# Patient Record
Sex: FEMALE | Race: WHITE | NOT HISPANIC OR LATINO | Employment: UNEMPLOYED | URBAN - METROPOLITAN AREA
[De-identification: names, ages, dates, MRNs, and addresses within clinical notes are randomized per-mention and may not be internally consistent; named-entity substitution may affect disease eponyms.]

---

## 2017-01-04 ENCOUNTER — OFFICE VISIT (OUTPATIENT)
Dept: FAMILY MEDICINE CLINIC | Facility: CLINIC | Age: 57
End: 2017-01-04

## 2017-01-04 VITALS
HEIGHT: 65 IN | SYSTOLIC BLOOD PRESSURE: 132 MMHG | BODY MASS INDEX: 22.82 KG/M2 | OXYGEN SATURATION: 98 % | HEART RATE: 87 BPM | DIASTOLIC BLOOD PRESSURE: 84 MMHG | WEIGHT: 137 LBS | TEMPERATURE: 99 F

## 2017-01-04 DIAGNOSIS — F98.8 ADD (ATTENTION DEFICIT DISORDER): Primary | ICD-10-CM

## 2017-01-04 DIAGNOSIS — M79.10 MYALGIA: ICD-10-CM

## 2017-01-04 DIAGNOSIS — Z79.899 HIGH RISK MEDICATION USE: ICD-10-CM

## 2017-01-04 PROCEDURE — 99213 OFFICE O/P EST LOW 20 MIN: CPT | Performed by: FAMILY MEDICINE

## 2017-01-04 RX ORDER — HYDROCODONE BITARTRATE AND ACETAMINOPHEN 10; 325 MG/1; MG/1
1 TABLET ORAL EVERY 6 HOURS PRN
Qty: 100 TABLET | Refills: 0 | Status: SHIPPED | OUTPATIENT
Start: 2017-01-04 | End: 2017-02-02 | Stop reason: SDUPTHER

## 2017-01-04 RX ORDER — IBUPROFEN 800 MG/1
TABLET ORAL
Refills: 0 | COMMUNITY
Start: 2016-12-24 | End: 2017-03-28

## 2017-01-04 RX ORDER — PENICILLIN V POTASSIUM 500 MG/1
TABLET ORAL
Refills: 0 | COMMUNITY
Start: 2016-12-24 | End: 2017-03-28

## 2017-01-04 NOTE — MR AVS SNAPSHOT
Miguelina Beanlivan   1/4/2017 10:20 AM   Office Visit    Dept Phone:  [phone]   Encounter #:  [id number]    Provider:  Guanaco Reid MD   Department:  [facility]                Your Full Care Plan              Today's Medication Changes          These changes are accurate as of: 1/4/17 10:59 AM.  If you have any questions, ask your nurse or doctor.               Medication(s)that have changed:     BREO ELLIPTA IN   Inhale.   What changed:  Another medication with the same name was removed. Continue taking this medication, and follow the directions you see here.       HYDROcodone-acetaminophen  MG per tablet   Commonly known as:  NORCO   Take 1 tablet by mouth Every 6 (Six) Hours As Needed for moderate pain (4-6).   What changed:  when to take this         Stop taking medication(s)listed here:     albuterol 108 (90 BASE) MCG/ACT inhaler   Commonly known as:  VENTOLIN HFA           diazePAM 2 MG tablet   Commonly known as:  VALIUM                Where to Get Your Medications      You can get these medications from any pharmacy     Bring a paper prescription for each of these medications     HYDROcodone-acetaminophen  MG per tablet                  Your Updated Medication List          This list is accurate as of: 1/4/17 10:59 AM.  Always use your most recent med list.                acetaminophen 325 MG tablet   Commonly known as:  TYLENOL       * amphetamine-dextroamphetamine 10 MG tablet   Commonly known as:  ADDERALL       * amphetamine-dextroamphetamine 30 MG tablet   Commonly known as:  ADDERALL       BREO ELLIPTA IN       HYDROcodone-acetaminophen  MG per tablet   Commonly known as:  NORCO   Take 1 tablet by mouth Every 6 (Six) Hours As Needed for moderate pain (4-6).       ibuprofen 800 MG tablet   Commonly known as:  ADVIL,MOTRIN       penicillin v potassium 500 MG tablet   Commonly known as:  VEETID       * Notice:  This  "list has 2 medication(s) that are the same as other medications prescribed for you. Read the directions carefully, and ask your doctor or other care provider to review them with you.            You Were Diagnosed With        Codes Comments    ADD (attention deficit disorder)    -  Primary ICD-10-CM: F98.8  ICD-9-CM: 314.00     Myalgia     ICD-10-CM: M79.1  ICD-9-CM: 729.1       Instructions     None    Patient Instructions History      Upcoming Appointments     Visit Type Date Time Department    OFFICE VISIT 1/4/2017 10:20 AM [facility]    OFFICE VISIT 4/4/2017  9:40 AM [facility] Signup     Our records indicate that you have an active [employer] account.    You can view your After Visit Summary by going to [employer] and logging in with your [employer] username and password.  If you don't have a [employer] username and password but a parent or guardian has access to your record, the parent or guardian should login with their own [employer] username and password and access your record to view the After Visit Summary.    If you have questions, you can email [email or web address] or call [phone] to talk to our [employer] staff.  Remember, [employer] is NOT to be used for urgent needs.  For medical emergencies, dial 911.               Other Info from Your Visit           Your Appointments     Apr 04, 2017  9:40 AM EDT   Office Visit with Guanaco Reid MD   [facility] (--)    [address] [facility] [id number] [phone]           Arrive 15 minutes prior to appointment.              Allergies     Tetracyclines & Related        Reason for Visit     Follow-up 3 month f/u     ADD     COPD     Med Refill           Vital Signs     Blood Pressure Pulse Temperature Height Weight Oxygen Saturation    132/84 (BP Location: Left arm, Patient Position: Sitting, Cuff Size: Adult) 87 99 °F (37.2 °C) 65\" (165.1 cm) 137 lb (62.1 " kg) 98%    Body Mass Index Smoking Status                22.8 kg/m2 Former Smoker          Problems and Diagnoses Noted     ADD (attention deficit disorder)    Myalgia

## 2017-01-04 NOTE — PROGRESS NOTES
HPI  Miguelina Cruz is a 56 y.o. female who is here for follow up of fibromyalgia and attention deficit disorder.  Followed by psychiatrist.  Had recent dental surgery for abscess and was given pain medication.  This was not filled.  Also saw a podiatrist for foot pain and was given cortisone pills but she did not fill them.      Review of Systems   HENT: Positive for dental problem.          No past medical history on file.    Past Surgical History   Procedure Laterality Date   • Foot surgery         Family History   Problem Relation Age of Onset   • Lung disease Mother    • Other Other      malignant neoplasm   • Other Other      malignant neoplasm       Social History     Social History   • Marital status: Single     Spouse name: N/A   • Number of children: N/A   • Years of education: N/A     Occupational History   • Not on file.     Social History Main Topics   • Smoking status: Former Smoker   • Smokeless tobacco: Not on file   • Alcohol use Not on file   • Drug use: Not on file   • Sexual activity: Not on file     Other Topics Concern   • Not on file     Social History Narrative         Physical Exam   Constitutional: She is oriented to person, place, and time. She appears well-developed and well-nourished. No distress.   HENT:   Head: Normocephalic.   Mouth/Throat: Oropharynx is clear and moist.   Eyes: Conjunctivae and EOM are normal. Pupils are equal, round, and reactive to light.   Neck: Neck supple. No JVD present. Thyromegaly present.   Cardiovascular: Normal rate and regular rhythm.    Pulmonary/Chest: Effort normal and breath sounds normal.   Abdominal: Soft. She exhibits no distension. There is no tenderness.   Neurological: She is alert and oriented to person, place, and time. No cranial nerve deficit. She exhibits normal muscle tone. Coordination normal.   Skin: Skin is warm and dry.   Psychiatric: She has a normal mood and affect. Her behavior is normal. Judgment and thought content normal.    Nursing note and vitals reviewed.        Assessment/Plan    Miguelina was seen today for follow-up, add, copd and med refill.    Diagnoses and all orders for this visit:    ADD (attention deficit disorder)    Myalgia    Other orders  -     HYDROcodone-acetaminophen (NORCO)  MG per tablet; Take 1 tablet by mouth Every 6 (Six) Hours As Needed for moderate pain (4-6).     patient mostly here for routine follow-up of chronic muscle pain and attention deficit.  ADD is followed by her psychiatrist.  Muscle pain well controlled with current medication though did increase somewhat after a recent dental abscess requiring surgical intervention.  Continue current therapy and close follow-up in 3 months.    This note includes information entered using a voice recognition dictation system.  Though reviewed, some nonsensible errors may remain.

## 2017-02-01 ENCOUNTER — TELEPHONE (OUTPATIENT)
Dept: FAMILY MEDICINE CLINIC | Facility: CLINIC | Age: 57
End: 2017-02-01

## 2017-02-01 NOTE — TELEPHONE ENCOUNTER
Last office visit 01/04/2017, william 11/07/2016, filled 01/04/2017.    ----- Message from Sapphire Talbert sent at 2/1/2017  8:29 AM EST -----  HYDROcodone-acetaminophen (NORCO)  MG per tablet  Sig: Take 1 tablet by mouth Every 6 (Six) Hours As Needed for moderate pain (4-6).      [phone]

## 2017-02-02 RX ORDER — HYDROCODONE BITARTRATE AND ACETAMINOPHEN 10; 325 MG/1; MG/1
1 TABLET ORAL EVERY 6 HOURS PRN
Qty: 100 TABLET | Refills: 0 | Status: SHIPPED | OUTPATIENT
Start: 2017-02-02 | End: 2017-03-02 | Stop reason: SDUPTHER

## 2017-03-01 ENCOUNTER — TELEPHONE (OUTPATIENT)
Dept: FAMILY MEDICINE CLINIC | Facility: CLINIC | Age: 57
End: 2017-03-01

## 2017-03-01 NOTE — TELEPHONE ENCOUNTER
Future appointment 03/28/2017.    Last office visit 01/04/2017, william 11/07/2016 (UPDATED/DESK), filled 02/02/2017.    ----- Message from Gracia Kenyon sent at 3/1/2017  3:17 PM EST -----  Contact: PT  PT NEEDS A NEW RX FOR HYDROCODONE  MG 1 QID PRN      WOULD LIKE TO  TOMORROW    THANK YOU

## 2017-03-02 RX ORDER — HYDROCODONE BITARTRATE AND ACETAMINOPHEN 10; 325 MG/1; MG/1
1 TABLET ORAL EVERY 6 HOURS PRN
Qty: 100 TABLET | Refills: 0 | Status: SHIPPED | OUTPATIENT
Start: 2017-03-02 | End: 2017-03-28 | Stop reason: SDUPTHER

## 2017-03-28 ENCOUNTER — OFFICE VISIT (OUTPATIENT)
Dept: FAMILY MEDICINE CLINIC | Facility: CLINIC | Age: 57
End: 2017-03-28

## 2017-03-28 VITALS
BODY MASS INDEX: 23.16 KG/M2 | HEIGHT: 65 IN | WEIGHT: 139 LBS | SYSTOLIC BLOOD PRESSURE: 122 MMHG | DIASTOLIC BLOOD PRESSURE: 78 MMHG | OXYGEN SATURATION: 99 % | HEART RATE: 82 BPM | TEMPERATURE: 98.5 F

## 2017-03-28 DIAGNOSIS — J44.9 CHRONIC OBSTRUCTIVE PULMONARY DISEASE, UNSPECIFIED COPD TYPE (HCC): ICD-10-CM

## 2017-03-28 DIAGNOSIS — F98.8 ADD (ATTENTION DEFICIT DISORDER): ICD-10-CM

## 2017-03-28 DIAGNOSIS — M79.10 MYALGIA: ICD-10-CM

## 2017-03-28 DIAGNOSIS — Z79.899 HIGH RISK MEDICATION USE: Primary | ICD-10-CM

## 2017-03-28 DIAGNOSIS — Z00.00 HEALTH CARE MAINTENANCE: ICD-10-CM

## 2017-03-28 PROCEDURE — 99214 OFFICE O/P EST MOD 30 MIN: CPT | Performed by: FAMILY MEDICINE

## 2017-03-28 RX ORDER — HYDROCODONE BITARTRATE AND ACETAMINOPHEN 10; 325 MG/1; MG/1
1 TABLET ORAL EVERY 6 HOURS PRN
Qty: 100 TABLET | Refills: 0 | Status: SHIPPED | OUTPATIENT
Start: 2017-03-28 | End: 2017-04-24 | Stop reason: SDUPTHER

## 2017-03-28 RX ORDER — ALBUTEROL SULFATE 90 UG/1
2 AEROSOL, METERED RESPIRATORY (INHALATION) EVERY 4 HOURS PRN
Qty: 1 INHALER | Refills: 6 | Status: SHIPPED | OUTPATIENT
Start: 2017-03-28 | End: 2017-06-13 | Stop reason: SDUPTHER

## 2017-03-28 NOTE — PROGRESS NOTES
HPI  Miguelina Cruz is a 56 y.o. female who is here for follow up of chronic muscle pain well controlled with current medications.  Also COPD though discontinued smoking 3 years ago.  Reports little benefit from Brio and discussed trying a different medication.  Only uses rescue inhaler on an intermittent basis.  Also asking about repeat lab work which will be obtained.  Also requested results of previous pulmonary function tests which unfortunately are very difficult to obtain actual results though able to locate orders and large amount of other useless information.      Review of Systems   Respiratory: Negative for cough and wheezing.    Musculoskeletal: Positive for myalgias.   Psychiatric/Behavioral:        Treated for ADD   All other systems reviewed and are negative.        No past medical history on file.    Past Surgical History:   Procedure Laterality Date   • FOOT SURGERY         Family History   Problem Relation Age of Onset   • Lung disease Mother    • Other Other      malignant neoplasm   • Other Other      malignant neoplasm       Social History     Social History   • Marital status: Single     Spouse name: N/A   • Number of children: N/A   • Years of education: N/A     Occupational History   • Not on file.     Social History Main Topics   • Smoking status: Former Smoker   • Smokeless tobacco: Not on file   • Alcohol use Not on file   • Drug use: Not on file   • Sexual activity: Not on file     Other Topics Concern   • Not on file     Social History Narrative         Physical Exam   Constitutional: She is oriented to person, place, and time. She appears well-developed and well-nourished.   HENT:   Head: Normocephalic.   Nose: Nose normal.   Mouth/Throat: Oropharynx is clear and moist.   Eyes: Conjunctivae and EOM are normal. Pupils are equal, round, and reactive to light.   Neck: Neck supple. No JVD present. No tracheal deviation present. No thyromegaly present.   Cardiovascular: Normal rate, regular  rhythm and normal heart sounds.    Pulmonary/Chest: Effort normal and breath sounds normal.   Abdominal: Soft.   Musculoskeletal: Normal range of motion. She exhibits no edema or deformity.   Lymphadenopathy:     She has no cervical adenopathy.   Neurological: She is alert and oriented to person, place, and time.   Skin: Skin is warm and dry.   Psychiatric: She has a normal mood and affect. Her behavior is normal. Judgment and thought content normal.   Nursing note and vitals reviewed.        Assessment/Plan    Miguelina was seen today for follow-up, add, pain and labs only.    Diagnoses and all orders for this visit:    High risk medication use  -     CBC & Differential  -     Comprehensive Metabolic Panel    Health care maintenance  -     Lipid Panel  -     Hepatitis C Antibody    Chronic obstructive pulmonary disease, unspecified COPD type  -     Umeclidinium-Vilanterol (ANORO ELLIPTA) 62.5-25 MCG/INH aerosol powder ; Inhale 1 inhaler Daily.    ADD (attention deficit disorder)    Myalgia    Other orders  -     albuterol (PROVENTIL HFA;VENTOLIN HFA) 108 (90 BASE) MCG/ACT inhaler; Inhale 2 puffs Every 4 (Four) Hours As Needed for Wheezing.  -     HYDROcodone-acetaminophen (NORCO)  MG per tablet; Take 1 tablet by mouth Every 6 (Six) Hours As Needed for Moderate Pain (4-6).          Patient here for follow-up of above-noted medical problems.  Reports she notes little benefit from current Breo inhaler.  Discussed trial of a different inhaler for her COPD.  Uses rescue inhaler on rare occasion but would like a refill.  Otherwise will get routine annual lab as discussed and keep routine follow-up appointment in 3 months especially for continuation of intermittent pain medication for her myalgia.    This note includes information entered using a voice recognition dictation system.  Though reviewed, some nonsensible errors may remain.

## 2017-03-29 LAB
ALBUMIN SERPL-MCNC: 5 G/DL (ref 3.5–5.2)
ALBUMIN/GLOB SERPL: 2.1 G/DL
ALP SERPL-CCNC: 64 U/L (ref 39–117)
ALT SERPL-CCNC: 12 U/L (ref 1–33)
AST SERPL-CCNC: 21 U/L (ref 1–32)
BASOPHILS # BLD AUTO: 0.01 10*3/MM3 (ref 0–0.2)
BASOPHILS NFR BLD AUTO: 0.1 % (ref 0–1.5)
BILIRUB SERPL-MCNC: 0.5 MG/DL (ref 0.1–1.2)
BUN SERPL-MCNC: 14 MG/DL (ref 6–20)
BUN/CREAT SERPL: 17.9 (ref 7–25)
CALCIUM SERPL-MCNC: 10 MG/DL (ref 8.6–10.5)
CHLORIDE SERPL-SCNC: 100 MMOL/L (ref 98–107)
CHOLEST SERPL-MCNC: 174 MG/DL (ref 0–200)
CO2 SERPL-SCNC: 24.3 MMOL/L (ref 22–29)
CREAT SERPL-MCNC: 0.78 MG/DL (ref 0.57–1)
EOSINOPHIL # BLD AUTO: 0.11 10*3/MM3 (ref 0–0.7)
EOSINOPHIL NFR BLD AUTO: 1.5 % (ref 0.3–6.2)
ERYTHROCYTE [DISTWIDTH] IN BLOOD BY AUTOMATED COUNT: 11.7 % (ref 11.7–13)
GLOBULIN SER CALC-MCNC: 2.4 GM/DL
GLUCOSE SERPL-MCNC: 100 MG/DL (ref 65–99)
HCT VFR BLD AUTO: 43.9 % (ref 35.6–45.5)
HCV AB S/CO SERPL IA: <0.1 S/CO RATIO (ref 0–0.9)
HDLC SERPL-MCNC: 85 MG/DL (ref 40–60)
HGB BLD-MCNC: 14.6 G/DL (ref 11.9–15.5)
IMM GRANULOCYTES # BLD: 0 10*3/MM3 (ref 0–0.03)
IMM GRANULOCYTES NFR BLD: 0 % (ref 0–0.5)
INTERPRETATION: NORMAL
LDLC SERPL CALC-MCNC: 72 MG/DL (ref 0–100)
LYMPHOCYTES # BLD AUTO: 2.47 10*3/MM3 (ref 0.9–4.8)
LYMPHOCYTES NFR BLD AUTO: 32.6 % (ref 19.6–45.3)
Lab: NORMAL
MCH RBC QN AUTO: 33.7 PG (ref 26.9–32)
MCHC RBC AUTO-ENTMCNC: 33.3 G/DL (ref 32.4–36.3)
MCV RBC AUTO: 101.4 FL (ref 80.5–98.2)
MONOCYTES # BLD AUTO: 0.45 10*3/MM3 (ref 0.2–1.2)
MONOCYTES NFR BLD AUTO: 5.9 % (ref 5–12)
NEUTROPHILS # BLD AUTO: 4.53 10*3/MM3 (ref 1.9–8.1)
NEUTROPHILS NFR BLD AUTO: 59.9 % (ref 42.7–76)
PLATELET # BLD AUTO: 351 10*3/MM3 (ref 140–500)
POTASSIUM SERPL-SCNC: 4 MMOL/L (ref 3.5–5.2)
PROT SERPL-MCNC: 7.4 G/DL (ref 6–8.5)
RBC # BLD AUTO: 4.33 10*6/MM3 (ref 3.9–5.2)
SODIUM SERPL-SCNC: 140 MMOL/L (ref 136–145)
TRIGL SERPL-MCNC: 86 MG/DL (ref 0–150)
VLDLC SERPL CALC-MCNC: 17.2 MG/DL (ref 5–40)
WBC # BLD AUTO: 7.57 10*3/MM3 (ref 4.5–10.7)

## 2017-04-05 ENCOUNTER — TELEPHONE (OUTPATIENT)
Dept: FAMILY MEDICINE CLINIC | Facility: CLINIC | Age: 57
End: 2017-04-05

## 2017-04-05 DIAGNOSIS — J44.9 CHRONIC OBSTRUCTIVE PULMONARY DISEASE, UNSPECIFIED COPD TYPE (HCC): ICD-10-CM

## 2017-04-05 NOTE — TELEPHONE ENCOUNTER
----- Message from Gracia Kenyon sent at 4/5/2017  9:14 AM EDT -----  Contact: PT  DR BLANTON GAVE HER SAMPLES OF ANORO, IT WORKS FOR HER AND NOW SHE WANTS A RX    [phone]

## 2017-04-24 RX ORDER — HYDROCODONE BITARTRATE AND ACETAMINOPHEN 10; 325 MG/1; MG/1
1 TABLET ORAL EVERY 6 HOURS PRN
Qty: 100 TABLET | Refills: 0 | Status: SHIPPED | OUTPATIENT
Start: 2017-04-24 | End: 2017-05-22 | Stop reason: SDUPTHER

## 2017-04-24 NOTE — TELEPHONE ENCOUNTER
Last ov 3/28/17  Last william 2/27/17  Last filled 3/28/17  Ok to refill Rx ?     ----- Message from Sapphire Talbert sent at 4/24/2017 12:38 PM EDT -----  HYDROcodone-acetaminophen (NORCO)  MG per tablet   Sig: Take 1 tablet by mouth Every 6 (Six) Hours As Needed for Moderate Pain (4-6).

## 2017-05-10 ENCOUNTER — TELEPHONE (OUTPATIENT)
Dept: FAMILY MEDICINE CLINIC | Facility: CLINIC | Age: 57
End: 2017-05-10

## 2017-05-10 DIAGNOSIS — J44.9 CHRONIC OBSTRUCTIVE PULMONARY DISEASE, UNSPECIFIED COPD TYPE (HCC): ICD-10-CM

## 2017-05-22 ENCOUNTER — TELEPHONE (OUTPATIENT)
Dept: FAMILY MEDICINE CLINIC | Facility: CLINIC | Age: 57
End: 2017-05-22

## 2017-05-22 RX ORDER — HYDROCODONE BITARTRATE AND ACETAMINOPHEN 10; 325 MG/1; MG/1
1 TABLET ORAL EVERY 6 HOURS PRN
Qty: 100 TABLET | Refills: 0 | Status: SHIPPED | OUTPATIENT
Start: 2017-05-22 | End: 2017-06-16 | Stop reason: SDUPTHER

## 2017-06-13 ENCOUNTER — OFFICE VISIT (OUTPATIENT)
Dept: FAMILY MEDICINE CLINIC | Facility: CLINIC | Age: 57
End: 2017-06-13

## 2017-06-13 VITALS
BODY MASS INDEX: 22.02 KG/M2 | OXYGEN SATURATION: 94 % | SYSTOLIC BLOOD PRESSURE: 116 MMHG | HEART RATE: 87 BPM | HEIGHT: 66 IN | DIASTOLIC BLOOD PRESSURE: 80 MMHG | WEIGHT: 137 LBS

## 2017-06-13 DIAGNOSIS — M79.10 MYALGIA: ICD-10-CM

## 2017-06-13 DIAGNOSIS — Z79.899 HIGH RISK MEDICATION USE: ICD-10-CM

## 2017-06-13 DIAGNOSIS — J44.9 CHRONIC OBSTRUCTIVE PULMONARY DISEASE, UNSPECIFIED COPD TYPE (HCC): Primary | ICD-10-CM

## 2017-06-13 PROCEDURE — 99214 OFFICE O/P EST MOD 30 MIN: CPT | Performed by: FAMILY MEDICINE

## 2017-06-13 RX ORDER — ALBUTEROL SULFATE 90 UG/1
2 AEROSOL, METERED RESPIRATORY (INHALATION) EVERY 4 HOURS PRN
Qty: 1 INHALER | Refills: 6 | Status: SHIPPED | OUTPATIENT
Start: 2017-06-13 | End: 2018-08-08 | Stop reason: SDUPTHER

## 2017-06-13 NOTE — PROGRESS NOTES
MARGARITA Cruz is a 56 y.o. female who is here for follow up especially of questionable COPD.  Some confusion regarding current medications but apparently was using both Anoro and Breo.  This was discussed.  Patient reports intermittent episodes of shortness of air and history is more consistent with asthma.  Of interest pulmonary function tests were fairly unremarkable though chest x-ray read as showing emphysema.  Patient discontinued cigarette use 4 years ago.      Review of Systems   Respiratory: Positive for shortness of breath. Negative for wheezing.    Musculoskeletal: Positive for myalgias.   All other systems reviewed and are negative.        No past medical history on file.    Past Surgical History:   Procedure Laterality Date   • FOOT SURGERY         Family History   Problem Relation Age of Onset   • Lung disease Mother    • Other Other      malignant neoplasm   • Other Other      malignant neoplasm       Social History     Social History   • Marital status: Single     Spouse name: N/A   • Number of children: N/A   • Years of education: N/A     Occupational History   • Not on file.     Social History Main Topics   • Smoking status: Former Smoker   • Smokeless tobacco: Not on file   • Alcohol use Not on file   • Drug use: Not on file   • Sexual activity: Not on file     Other Topics Concern   • Not on file     Social History Narrative         Physical Exam   Constitutional: She is oriented to person, place, and time. She appears well-developed and well-nourished.   HENT:   Head: Normocephalic.   Mouth/Throat: Oropharynx is clear and moist.   Eyes: Conjunctivae are normal. Pupils are equal, round, and reactive to light.   Neck: Normal range of motion. Neck supple. No thyromegaly present.   Cardiovascular: Normal rate and regular rhythm.    Pulmonary/Chest: Effort normal. No respiratory distress. She has no wheezes. She has no rales.   Abdominal: Soft. She exhibits no distension.   Musculoskeletal: She  exhibits no edema.   Neurological: She is alert and oriented to person, place, and time. Coordination normal.   Skin: Skin is warm.   Psychiatric: She has a normal mood and affect. Her behavior is normal. Judgment and thought content normal.   Nursing note and vitals reviewed.        Assessment/Plan    Diagnoses and all orders for this visit:    Chronic obstructive pulmonary disease, unspecified COPD type    Myalgia    High risk medication use    Other orders  -     Fluticasone Furoate-Vilanterol (BREO ELLIPTA) 100-25 MCG/INH aerosol powder ; Inhale 1 inhaler Daily.  -     albuterol (PROVENTIL HFA;VENTOLIN HFA) 108 (90 BASE) MCG/ACT inhaler; Inhale 2 puffs Every 4 (Four) Hours As Needed for Wheezing.  -     Umeclidinium Bromide (INCRUSE ELLIPTA) 62.5 MCG/INH aerosol powder ; Inhale 1 inhaler Daily.      Patient here for routine follow-up especially of myalgias which are well controlled with current regimen.  Followed by psychiatrist for attention deficit disorder also doing well.  Some confusion regarding therapy for lung problems.  This is complicated by insurance coverage which seems to be changing every month.  Discussed continuing current medication with rescue inhaler.  Will give sample of additional medication and call if improves and will try to determine what medication is currently covered?  Otherwise continue current therapy and follow-up in 3 months.    This note includes information entered using a voice recognition dictation system.  Though reviewed, some nonsensible errors may remain.

## 2017-06-16 ENCOUNTER — TELEPHONE (OUTPATIENT)
Dept: FAMILY MEDICINE CLINIC | Facility: CLINIC | Age: 57
End: 2017-06-16

## 2017-06-16 RX ORDER — HYDROCODONE BITARTRATE AND ACETAMINOPHEN 10; 325 MG/1; MG/1
1 TABLET ORAL EVERY 6 HOURS PRN
Qty: 100 TABLET | Refills: 0 | Status: SHIPPED | OUTPATIENT
Start: 2017-06-16 | End: 2017-07-12 | Stop reason: SDUPTHER

## 2017-06-16 NOTE — TELEPHONE ENCOUNTER
Hydrocodone 10-325mg one tablet 3-4 times daily    [phone]    Last ov 6/2017,sandeep 5/2017, rf5/22/17

## 2017-07-11 ENCOUNTER — TELEPHONE (OUTPATIENT)
Dept: FAMILY MEDICINE CLINIC | Facility: CLINIC | Age: 57
End: 2017-07-11

## 2017-07-11 NOTE — TELEPHONE ENCOUNTER
No future appointment.    Last office visit 06/13/2017.  Braxton 05/20/2017.  Filled 06/16/2017.    Thank you.    ----- Message from Sapphire Talbert sent at 7/11/2017 12:26 PM EDT -----  HYDROcodone-acetaminophen (NORCO)  MG per tablet  Sig: Take 1 tablet by mouth Every 6 (Six) Hours As Needed for Moderate Pain (4-6).

## 2017-07-12 RX ORDER — HYDROCODONE BITARTRATE AND ACETAMINOPHEN 10; 325 MG/1; MG/1
1 TABLET ORAL EVERY 6 HOURS PRN
Qty: 100 TABLET | Refills: 0 | Status: SHIPPED | OUTPATIENT
Start: 2017-07-12 | End: 2017-08-04 | Stop reason: SDUPTHER

## 2017-08-04 RX ORDER — HYDROCODONE BITARTRATE AND ACETAMINOPHEN 10; 325 MG/1; MG/1
1 TABLET ORAL EVERY 6 HOURS PRN
Qty: 100 TABLET | Refills: 0 | Status: SHIPPED | OUTPATIENT
Start: 2017-08-04 | End: 2017-08-28 | Stop reason: SDUPTHER

## 2017-08-04 NOTE — TELEPHONE ENCOUNTER
Last ov 6/13/17  Last william 5/20/17  Last filled 7/12/17  Ok to refill ?     ----- Message from Sapphire Talbert sent at 8/4/2017  9:55 AM EDT -----  PT NEEDS WRITTEN RX FOR:  #HYDROCODONE 10/325 1 QID #100  PLEASE CALL [phone] WHEN READY

## 2017-08-28 ENCOUNTER — OFFICE VISIT (OUTPATIENT)
Dept: FAMILY MEDICINE CLINIC | Facility: CLINIC | Age: 57
End: 2017-08-28

## 2017-08-28 VITALS
SYSTOLIC BLOOD PRESSURE: 136 MMHG | DIASTOLIC BLOOD PRESSURE: 72 MMHG | BODY MASS INDEX: 22.11 KG/M2 | TEMPERATURE: 99.2 F | WEIGHT: 137.6 LBS | HEIGHT: 66 IN | OXYGEN SATURATION: 99 % | RESPIRATION RATE: 16 BRPM | HEART RATE: 63 BPM

## 2017-08-28 DIAGNOSIS — Z79.899 HIGH RISK MEDICATION USE: ICD-10-CM

## 2017-08-28 DIAGNOSIS — R30.0 DYSURIA: ICD-10-CM

## 2017-08-28 DIAGNOSIS — M79.10 MYALGIA: Primary | ICD-10-CM

## 2017-08-28 DIAGNOSIS — J44.9 CHRONIC OBSTRUCTIVE PULMONARY DISEASE, UNSPECIFIED COPD TYPE (HCC): ICD-10-CM

## 2017-08-28 LAB
BILIRUB BLD-MCNC: NEGATIVE MG/DL
CLARITY, POC: CLEAR
COLOR UR: YELLOW
GLUCOSE UR STRIP-MCNC: NEGATIVE MG/DL
KETONES UR QL: NEGATIVE
LEUKOCYTE EST, POC: ABNORMAL
NITRITE UR-MCNC: NEGATIVE MG/ML
PH UR: 5.5 [PH] (ref 5–8)
PROT UR STRIP-MCNC: NEGATIVE MG/DL
RBC # UR STRIP: ABNORMAL /UL
SP GR UR: 1.02 (ref 1–1.03)
UROBILINOGEN UR QL: NORMAL

## 2017-08-28 PROCEDURE — 99213 OFFICE O/P EST LOW 20 MIN: CPT | Performed by: FAMILY MEDICINE

## 2017-08-28 RX ORDER — HYDROCODONE BITARTRATE AND ACETAMINOPHEN 10; 325 MG/1; MG/1
1 TABLET ORAL EVERY 6 HOURS PRN
Qty: 100 TABLET | Refills: 0 | Status: SHIPPED | OUTPATIENT
Start: 2017-08-28 | End: 2017-09-22 | Stop reason: SDUPTHER

## 2017-08-28 NOTE — PROGRESS NOTES
HPI  Miguelina Cruz is a 57 y.o. female who is here for follow up especially of pain medication needed for general muscular skeletal aches and pains.  She remains functional on current medications and there is no evidence of overuse or abuse.  Seems to be doing very well on current regimen.  Over the last several weeks has had intermittent episodes of urinary urgency and frequency and some dysuria.  Reports has not had bladder infection for several years.  Has been using cranberry tablets and drinking cranberry juice which she thinks has helped.      Review of Systems   Constitutional: Negative for chills and fever.   Genitourinary: Positive for difficulty urinating, dysuria and frequency.   All other systems reviewed and are negative.        No past medical history on file.    Past Surgical History:   Procedure Laterality Date   • FOOT SURGERY         Family History   Problem Relation Age of Onset   • Lung disease Mother    • Other Other      malignant neoplasm   • Other Other      malignant neoplasm       Social History     Social History   • Marital status: Single     Spouse name: N/A   • Number of children: N/A   • Years of education: N/A     Occupational History   • Not on file.     Social History Main Topics   • Smoking status: Former Smoker   • Smokeless tobacco: Not on file   • Alcohol use Yes   • Drug use: Yes     Special: Hydrocodone   • Sexual activity: No     Other Topics Concern   • Not on file     Social History Narrative         Physical Exam   Constitutional: She is oriented to person, place, and time. She appears well-developed and well-nourished. No distress.   HENT:   Head: Normocephalic.   Nose: Nose normal.   Mouth/Throat: Oropharynx is clear and moist.   Eyes: Conjunctivae and EOM are normal. Pupils are equal, round, and reactive to light.   Neck: Normal range of motion. Neck supple.   Cardiovascular: Normal rate, regular rhythm and normal heart sounds.    Pulmonary/Chest: Effort normal. No  respiratory distress.   Abdominal: Soft. Bowel sounds are normal.   Musculoskeletal: She exhibits no edema or deformity.   Neurological: She is alert and oriented to person, place, and time.   Skin: Skin is warm and dry.   Psychiatric: She has a normal mood and affect. Her behavior is normal. Judgment and thought content normal.   Nursing note and vitals reviewed.        Assessment/Plan    Miguelina was seen today for copd and urinary tract infection.    Diagnoses and all orders for this visit:    Myalgia  -     HYDROcodone-acetaminophen (NORCO)  MG per tablet; Take 1 tablet by mouth Every 6 (Six) Hours As Needed for Moderate Pain .    Chronic obstructive pulmonary disease, unspecified COPD type    High risk medication use    Dysuria  -     POCT urinalysis dipstick, automated  -     Urine Culture      Patient presents for routine follow-up of chronic musculoskeletal pains well controlled with current medication.  Over the last few weeks has been having some urinary urgency and frequency and is concerned about urinary tract infection.  Urinalysis in the office is unremarkable but will send for culture and view of persistent symptoms.  Treat with appropriate antibiotic if indicated.  Also need to discuss recommendations for routine mammography and colonoscopy.  Pulmonary status seems to be very stable on current regimen.  Follow-up in 3 months or as needed.    This note includes information entered using a voice recognition dictation system.  Though reviewed, some nonsensible errors may remain.

## 2017-08-30 LAB
BACTERIA UR CULT: NO GROWTH
BACTERIA UR CULT: NORMAL

## 2017-09-21 ENCOUNTER — TELEPHONE (OUTPATIENT)
Dept: FAMILY MEDICINE CLINIC | Facility: CLINIC | Age: 57
End: 2017-09-21

## 2017-09-21 NOTE — TELEPHONE ENCOUNTER
NARC AGREEMENT 2016.    APPOINTMENT EVERY 3 MONTHS.    No future appointment.    Last O.V. 2017.  Braxton 2017.  Filled 2017.    Thank you.    ----- Message from Elijah Vaca sent at 2017 12:27 PM EDT -----  Patient would like a refill on hydrocodone 10mg #100    Thanks,  Dafne

## 2017-09-22 ENCOUNTER — TELEPHONE (OUTPATIENT)
Dept: FAMILY MEDICINE CLINIC | Facility: CLINIC | Age: 57
End: 2017-09-22

## 2017-09-22 DIAGNOSIS — Z12.31 ENCOUNTER FOR SCREENING MAMMOGRAM FOR BREAST CANCER: Primary | ICD-10-CM

## 2017-09-22 DIAGNOSIS — M79.10 MYALGIA: ICD-10-CM

## 2017-09-22 RX ORDER — HYDROCODONE BITARTRATE AND ACETAMINOPHEN 10; 325 MG/1; MG/1
1 TABLET ORAL EVERY 6 HOURS PRN
Qty: 100 TABLET | Refills: 0 | Status: SHIPPED | OUTPATIENT
Start: 2017-09-22 | End: 2017-10-16 | Stop reason: SDUPTHER

## 2017-09-22 NOTE — TELEPHONE ENCOUNTER
Patient would like an order for a mammogram to be done at [facility].    She received a letter stating she was due for one.    Patient's contact #[phone].    Thank you.

## 2017-10-10 ENCOUNTER — HOSPITAL ENCOUNTER (OUTPATIENT)
Dept: MAMMOGRAPHY | Facility: HOSPITAL | Age: 57
Discharge: HOME | End: 2017-10-10
Attending: FAMILY MEDICINE | Admitting: FAMILY MEDICINE

## 2017-10-10 DIAGNOSIS — Z12.31 ENCOUNTER FOR SCREENING MAMMOGRAM FOR BREAST CANCER: ICD-10-CM

## 2017-10-10 PROCEDURE — G0202 SCR MAMMO BI INCL CAD: HCPCS

## 2017-10-16 ENCOUNTER — TELEPHONE (OUTPATIENT)
Dept: FAMILY MEDICINE CLINIC | Facility: CLINIC | Age: 57
End: 2017-10-16

## 2017-10-16 DIAGNOSIS — M79.10 MYALGIA: ICD-10-CM

## 2017-10-16 RX ORDER — HYDROCODONE BITARTRATE AND ACETAMINOPHEN 10; 325 MG/1; MG/1
1 TABLET ORAL EVERY 6 HOURS PRN
Qty: 100 TABLET | Refills: 0 | Status: SHIPPED | OUTPATIENT
Start: 2017-10-16 | End: 2017-11-09 | Stop reason: SDUPTHER

## 2017-10-16 NOTE — TELEPHONE ENCOUNTER
No future appointment.    Last O.V. 08/28/2017.  Braxton 09/19/2017.  Filled 09/22/2017.    Thank you.      ----- Message from Elijah Vaca sent at 10/16/2017  8:07 AM EDT -----  Patient would like a refill on hydrocodone 10mg #100    She can be reached at [phone]    Thanks,  Dafne

## 2017-11-08 ENCOUNTER — TELEPHONE (OUTPATIENT)
Dept: FAMILY MEDICINE CLINIC | Facility: CLINIC | Age: 57
End: 2017-11-08

## 2017-11-09 DIAGNOSIS — M79.10 MYALGIA: ICD-10-CM

## 2017-11-09 RX ORDER — HYDROCODONE BITARTRATE AND ACETAMINOPHEN 10; 325 MG/1; MG/1
1 TABLET ORAL EVERY 6 HOURS PRN
Qty: 100 TABLET | Refills: 0 | Status: SHIPPED | OUTPATIENT
Start: 2017-11-09 | End: 2017-12-01 | Stop reason: SDUPTHER

## 2017-11-27 ENCOUNTER — OFFICE VISIT (OUTPATIENT)
Dept: FAMILY MEDICINE CLINIC | Facility: CLINIC | Age: 57
End: 2017-11-27

## 2017-11-27 VITALS
WEIGHT: 141.4 LBS | BODY MASS INDEX: 22.73 KG/M2 | SYSTOLIC BLOOD PRESSURE: 150 MMHG | DIASTOLIC BLOOD PRESSURE: 88 MMHG | TEMPERATURE: 99.5 F | HEART RATE: 86 BPM | RESPIRATION RATE: 16 BRPM | HEIGHT: 66 IN | OXYGEN SATURATION: 99 %

## 2017-11-27 DIAGNOSIS — F98.8 ATTENTION DEFICIT DISORDER, UNSPECIFIED HYPERACTIVITY PRESENCE: ICD-10-CM

## 2017-11-27 DIAGNOSIS — Z23 IMMUNIZATION DUE: ICD-10-CM

## 2017-11-27 DIAGNOSIS — J44.9 CHRONIC OBSTRUCTIVE PULMONARY DISEASE, UNSPECIFIED COPD TYPE (HCC): Primary | ICD-10-CM

## 2017-11-27 DIAGNOSIS — M21.962 ACQUIRED DEFORMITY OF LEFT FOOT: ICD-10-CM

## 2017-11-27 DIAGNOSIS — M79.10 MYALGIA: ICD-10-CM

## 2017-11-27 DIAGNOSIS — Z79.899 HIGH RISK MEDICATION USE: ICD-10-CM

## 2017-11-27 PROCEDURE — 90686 IIV4 VACC NO PRSV 0.5 ML IM: CPT | Performed by: FAMILY MEDICINE

## 2017-11-27 PROCEDURE — 90471 IMMUNIZATION ADMIN: CPT | Performed by: FAMILY MEDICINE

## 2017-11-27 PROCEDURE — 99213 OFFICE O/P EST LOW 20 MIN: CPT | Performed by: FAMILY MEDICINE

## 2017-11-27 NOTE — PROGRESS NOTES
HPI  Miguelina Cruz is a 57 y.o. female who is here for follow up of COPD and also persistent left foot pain.  Is especially worse in the winter when she cannot wear opened toed shoes.  Was seen by a foot doctor one year ago and given medication only.  Would like to be referred to a foot doctor close to her home.  Has known COPD with intermittent shortness of air.  No longer smokes.  Otherwise seems to be stable on current regimen which will be continued.      Review of Systems   Respiratory: Positive for shortness of breath.    Musculoskeletal: Positive for gait problem and myalgias.   All other systems reviewed and are negative.        No past medical history on file.    Past Surgical History:   Procedure Laterality Date   • FOOT SURGERY         Family History   Problem Relation Age of Onset   • Lung disease Mother    • Other Other      malignant neoplasm   • Other Other      malignant neoplasm       Social History     Social History   • Marital status: Single     Spouse name: N/A   • Number of children: N/A   • Years of education: N/A     Occupational History   • Not on file.     Social History Main Topics   • Smoking status: Former Smoker   • Smokeless tobacco: Not on file   • Alcohol use Yes   • Drug use: Yes     Special: Hydrocodone   • Sexual activity: No     Other Topics Concern   • Not on file     Social History Narrative         Physical Exam      Assessment/Plan    Miguelina was seen today for copd, fibromyalgia and foot pain.    Diagnoses and all orders for this visit:    Chronic obstructive pulmonary disease, unspecified COPD type  -     Umeclidinium-Vilanterol 62.5-25 MCG/INH aerosol powder ; Inhale 1 puff Daily.    Attention deficit disorder, unspecified hyperactivity presence    High risk medication use    Acquired deformity of left foot  -     Ambulatory Referral to Podiatry      Patient is here for routine follow-up especially of chronic pain management related to myalgia.  Doing extremely well on  current regimen which will be continued.  Patient continues to work and is fully functional.  Main complaint is left foot pain which is worsened in the winter when she cannot wear opened toed shoes.  Has seen podiatrist in the past but requests referral to one closer to her home.  Also reports did better on previous inhaler but for some reason was not covered by insurance?  Currently on Breo though history most consistent with COPD more so than asthma?  Has discontinued cigarette use.  Overall doing well on current regimen except will change inhaler and call if symptoms worsen.  Otherwise follow-up in 3 months.    This note includes information entered using a voice recognition dictation system.  Though reviewed, some nonsensible errors may remain.

## 2017-12-01 ENCOUNTER — TELEPHONE (OUTPATIENT)
Dept: FAMILY MEDICINE CLINIC | Facility: CLINIC | Age: 57
End: 2017-12-01

## 2017-12-01 DIAGNOSIS — M79.10 MYALGIA: ICD-10-CM

## 2017-12-01 RX ORDER — HYDROCODONE BITARTRATE AND ACETAMINOPHEN 10; 325 MG/1; MG/1
1 TABLET ORAL EVERY 6 HOURS PRN
Qty: 100 TABLET | Refills: 0 | Status: SHIPPED | OUTPATIENT
Start: 2017-12-01 | End: 2017-12-27 | Stop reason: SDUPTHER

## 2017-12-01 NOTE — TELEPHONE ENCOUNTER
NORCO  MG.    No future appointment.    Last O.V. 11/27/2017.  Braxton 09/19/2017 (UPDATED).  Filled 11/09/2017.    Thank you.

## 2017-12-25 ENCOUNTER — HOSPITAL ENCOUNTER (EMERGENCY)
Facility: HOSPITAL | Age: 57
Discharge: LEFT BEFORE DISCHARGE | End: 2017-12-25

## 2017-12-25 VITALS
DIASTOLIC BLOOD PRESSURE: 78 MMHG | WEIGHT: 135 LBS | OXYGEN SATURATION: 98 % | BODY MASS INDEX: 22.49 KG/M2 | HEIGHT: 65 IN | TEMPERATURE: 98.2 F | SYSTOLIC BLOOD PRESSURE: 127 MMHG | RESPIRATION RATE: 18 BRPM | HEART RATE: 121 BPM

## 2017-12-25 LAB
FLUAV AG NPH QL: NEGATIVE
FLUBV AG NPH QL IA: NEGATIVE

## 2017-12-25 PROCEDURE — 87804 INFLUENZA ASSAY W/OPTIC: CPT | Performed by: EMERGENCY MEDICINE

## 2017-12-25 PROCEDURE — 99211 OFF/OP EST MAY X REQ PHY/QHP: CPT

## 2017-12-26 ENCOUNTER — TELEPHONE (OUTPATIENT)
Dept: FAMILY MEDICINE CLINIC | Facility: CLINIC | Age: 57
End: 2017-12-26

## 2017-12-26 NOTE — TELEPHONE ENCOUNTER
No future appointment.    Last O.V. 11/27/2017.  Braxton 11/29/2017.  Filled 12/01/2017.    Thank you.    ----- Message from Candace Goldberg sent at 12/26/2017 10:57 AM EST -----  PATIENT NEEDS A REFILL ON THE FOLLOWING  HYDROCODONE     PATIENT NUMBER [phone]

## 2017-12-27 DIAGNOSIS — M79.10 MYALGIA: ICD-10-CM

## 2017-12-27 RX ORDER — HYDROCODONE BITARTRATE AND ACETAMINOPHEN 10; 325 MG/1; MG/1
1 TABLET ORAL EVERY 6 HOURS PRN
Qty: 100 TABLET | Refills: 0 | Status: SHIPPED | OUTPATIENT
Start: 2017-12-27 | End: 2018-01-18 | Stop reason: SDUPTHER

## 2018-01-18 ENCOUNTER — TELEPHONE (OUTPATIENT)
Dept: FAMILY MEDICINE CLINIC | Facility: CLINIC | Age: 58
End: 2018-01-18

## 2018-01-18 DIAGNOSIS — M79.10 MYALGIA: ICD-10-CM

## 2018-01-18 RX ORDER — HYDROCODONE BITARTRATE AND ACETAMINOPHEN 10; 325 MG/1; MG/1
1 TABLET ORAL EVERY 6 HOURS PRN
Qty: 100 TABLET | Refills: 0 | Status: SHIPPED | OUTPATIENT
Start: 2018-01-18 | End: 2018-02-09 | Stop reason: SDUPTHER

## 2018-01-18 NOTE — TELEPHONE ENCOUNTER
No future appointment.    Last O.V. 11/27/2017.  Braxton 11/29/2017.  Filled 12/27/2017.    Thank you.    ----- Message from Sapphire Talbert sent at 1/18/2018 10:08 AM EST -----  HYDROcodone-acetaminophen (NORCO)  MG per tablet  Sig: Take 1 tablet by mouth Every 6 (Six) Hours As Needed for Moderate Pain .

## 2018-02-07 ENCOUNTER — OFFICE VISIT (OUTPATIENT)
Dept: FAMILY MEDICINE CLINIC | Facility: CLINIC | Age: 58
End: 2018-02-07

## 2018-02-07 VITALS
SYSTOLIC BLOOD PRESSURE: 136 MMHG | HEIGHT: 65 IN | BODY MASS INDEX: 24.16 KG/M2 | HEART RATE: 90 BPM | DIASTOLIC BLOOD PRESSURE: 82 MMHG | WEIGHT: 145 LBS | TEMPERATURE: 99 F | OXYGEN SATURATION: 94 %

## 2018-02-07 DIAGNOSIS — M79.10 MYALGIA: Primary | ICD-10-CM

## 2018-02-07 DIAGNOSIS — Z79.899 HIGH RISK MEDICATION USE: ICD-10-CM

## 2018-02-07 DIAGNOSIS — J44.9 CHRONIC OBSTRUCTIVE PULMONARY DISEASE, UNSPECIFIED COPD TYPE (HCC): ICD-10-CM

## 2018-02-07 PROCEDURE — 99213 OFFICE O/P EST LOW 20 MIN: CPT | Performed by: FAMILY MEDICINE

## 2018-02-07 NOTE — PROGRESS NOTES
MARGARITA Cruz is a 57 y.o. female who is here for follow up of chronic pain related to fibromyalgia.  Despite literature seems to be under excellent control with current regimen which will be continued.  Otherwise denies any new complaints and seems to be doing well.  Complains of COPD but remains off cigarettes.  Also followed by another physician for ADHD.      Review of Systems   Respiratory: Negative for cough, shortness of breath and wheezing.    Musculoskeletal: Positive for arthralgias and myalgias.   All other systems reviewed and are negative.        Past Medical History:   Diagnosis Date   • ADHD    • COPD (chronic obstructive pulmonary disease)    • Fibromyalgia        Past Surgical History:   Procedure Laterality Date   • FOOT SURGERY         Family History   Problem Relation Age of Onset   • Lung disease Mother    • Other Other      malignant neoplasm   • Other Other      malignant neoplasm       Social History     Social History   • Marital status: Single     Spouse name: N/A   • Number of children: N/A   • Years of education: N/A     Occupational History   • Not on file.     Social History Main Topics   • Smoking status: Former Smoker   • Smokeless tobacco: Not on file   • Alcohol use Yes      Comment: OCCASIONAL   • Drug use: No   • Sexual activity: No     Other Topics Concern   • Not on file     Social History Narrative         Physical Exam   Constitutional: She is oriented to person, place, and time. She appears well-developed and well-nourished.   HENT:   Head: Normocephalic.   Mouth/Throat: Oropharynx is clear and moist.   Eyes: Conjunctivae are normal. Pupils are equal, round, and reactive to light.   Neck: Normal range of motion. No thyromegaly present.   Cardiovascular: Normal rate.    Pulmonary/Chest: Effort normal and breath sounds normal. No respiratory distress.   Musculoskeletal: Normal range of motion.   Neurological: She is alert and oriented to person, place, and time.  Coordination normal.   Skin: Skin is warm and dry.   Psychiatric: She has a normal mood and affect. Her behavior is normal. Judgment and thought content normal.   Nursing note and vitals reviewed.        Assessment/Plan    Miguelina was seen today for add and myalgia.    Diagnoses and all orders for this visit:    Myalgia    High risk medication use  -     Urine Drug Screen - Urine, Clean Catch    Chronic obstructive pulmonary disease, unspecified COPD type      Patient is mostly here for follow-up of continued pain medication.  Functioning very well on current regimen with no evidence of misuse or abuse of medication.  Continue current therapy including follow-up every 3 months.    This note includes information entered using a voice recognition dictation system.  Though reviewed, some nonsensible errors may remain.

## 2018-02-08 ENCOUNTER — TELEPHONE (OUTPATIENT)
Dept: FAMILY MEDICINE CLINIC | Facility: CLINIC | Age: 58
End: 2018-02-08

## 2018-02-09 DIAGNOSIS — M79.10 MYALGIA: ICD-10-CM

## 2018-02-09 RX ORDER — HYDROCODONE BITARTRATE AND ACETAMINOPHEN 10; 325 MG/1; MG/1
1 TABLET ORAL EVERY 6 HOURS PRN
Qty: 100 TABLET | Refills: 0 | Status: SHIPPED | OUTPATIENT
Start: 2018-02-09 | End: 2018-03-05 | Stop reason: SDUPTHER

## 2018-02-15 LAB
AMPHET UR CFM-MCNC: >4000 NG/ML
AMPHET UR QL CFM: POSITIVE
AMPHETAMINES UR QL SCN: NORMAL NG/ML
AMPHETAMINES UR QL: POSITIVE
BARBITURATES UR QL SCN: NEGATIVE NG/ML
BENZODIAZ UR QL SCN: NEGATIVE NG/ML
BZE UR QL SCN: NEGATIVE NG/ML
CANNABINOIDS UR QL SCN: NEGATIVE NG/ML
CODEINE UR QL: NEGATIVE
CREAT UR-MCNC: 108.1 MG/DL (ref 20–300)
HYDROCODONE UR CFM-MCNC: 1170 NG/ML
HYDROCODONE UR QL: POSITIVE
HYDROMORPHONE UR CFM-MCNC: 833 NG/ML
HYDROMORPHONE UR QL: POSITIVE
LABORATORY COMMENT REPORT: NORMAL
METHADONE UR QL SCN: NEGATIVE NG/ML
METHAMPHET UR QL CFM: NEGATIVE
MORPHINE UR QL: NEGATIVE
OPIATES UR QL SCN: NORMAL NG/ML
OPIATES UR QL SCN: POSITIVE NG/ML
OXYCODONE+OXYMORPHONE UR QL SCN: NEGATIVE NG/ML
PCP UR QL: NEGATIVE NG/ML
PH UR: 5.5 [PH] (ref 4.5–8.9)
PROPOXYPH UR QL SCN: NEGATIVE NG/ML

## 2018-03-05 ENCOUNTER — TELEPHONE (OUTPATIENT)
Dept: FAMILY MEDICINE CLINIC | Facility: CLINIC | Age: 58
End: 2018-03-05

## 2018-03-05 DIAGNOSIS — M79.10 MYALGIA: ICD-10-CM

## 2018-03-05 RX ORDER — HYDROCODONE BITARTRATE AND ACETAMINOPHEN 10; 325 MG/1; MG/1
1 TABLET ORAL EVERY 6 HOURS PRN
Qty: 100 TABLET | Refills: 0 | Status: SHIPPED | OUTPATIENT
Start: 2018-03-05 | End: 2018-03-28 | Stop reason: SDUPTHER

## 2018-03-05 NOTE — TELEPHONE ENCOUNTER
Last ov 2/7/18  Last william 11/29/17  Last filled 2/9/18  Ok to refill ?    ----- Message from Elijah Vaca sent at 3/5/2018 11:01 AM EST -----  Patient would like a refill on hydrocodone  MG #100    Thanks,  Dafne

## 2018-03-28 ENCOUNTER — TELEPHONE (OUTPATIENT)
Dept: FAMILY MEDICINE CLINIC | Facility: CLINIC | Age: 58
End: 2018-03-28

## 2018-03-28 DIAGNOSIS — M79.10 MYALGIA: ICD-10-CM

## 2018-03-28 RX ORDER — HYDROCODONE BITARTRATE AND ACETAMINOPHEN 10; 325 MG/1; MG/1
1 TABLET ORAL EVERY 6 HOURS PRN
Qty: 100 TABLET | Refills: 0 | Status: SHIPPED | OUTPATIENT
Start: 2018-03-28 | End: 2018-04-19 | Stop reason: SDUPTHER

## 2018-03-28 NOTE — TELEPHONE ENCOUNTER
No future appointment.    Last O.V. 02/07/2018.  Braxton 11/29/2017. (UPDATED).  Filled 03/06/2018.    Thank you.    ----- Message from Sapphire Talbert sent at 3/28/2018  9:11 AM EDT -----  HYDROcodone-acetaminophen (NORCO)  MG per tablet  Sig: Take 1 tablet by mouth Every 6 (Six) Hours As Needed for Moderate Pain .

## 2018-04-19 ENCOUNTER — TELEPHONE (OUTPATIENT)
Dept: FAMILY MEDICINE CLINIC | Facility: CLINIC | Age: 58
End: 2018-04-19

## 2018-04-19 DIAGNOSIS — M79.10 MYALGIA: ICD-10-CM

## 2018-04-19 RX ORDER — HYDROCODONE BITARTRATE AND ACETAMINOPHEN 10; 325 MG/1; MG/1
1 TABLET ORAL EVERY 6 HOURS PRN
Qty: 100 TABLET | Refills: 0 | Status: SHIPPED | OUTPATIENT
Start: 2018-04-19 | End: 2018-05-10 | Stop reason: SDUPTHER

## 2018-04-19 NOTE — TELEPHONE ENCOUNTER
No future appointment.    Last O.V. 02/07/2018.  Braxton 03/26/2018.  Filled 03/28/2018.    Thank you.    ----- Message from Elijah Vaca sent at 4/19/2018  9:50 AM EDT -----  Patient would like a refill on hydrocodone 10/325 #100    Thanks,  Dafne

## 2018-05-05 DIAGNOSIS — J44.9 CHRONIC OBSTRUCTIVE PULMONARY DISEASE, UNSPECIFIED COPD TYPE (HCC): ICD-10-CM

## 2018-05-07 ENCOUNTER — OFFICE VISIT (OUTPATIENT)
Dept: FAMILY MEDICINE CLINIC | Facility: CLINIC | Age: 58
End: 2018-05-07

## 2018-05-07 VITALS
RESPIRATION RATE: 16 BRPM | HEART RATE: 67 BPM | DIASTOLIC BLOOD PRESSURE: 72 MMHG | OXYGEN SATURATION: 96 % | HEIGHT: 65 IN | SYSTOLIC BLOOD PRESSURE: 128 MMHG | TEMPERATURE: 99.6 F | BODY MASS INDEX: 24.29 KG/M2 | WEIGHT: 145.8 LBS

## 2018-05-07 DIAGNOSIS — Z87.891 HISTORY OF CIGARETTE SMOKING: ICD-10-CM

## 2018-05-07 DIAGNOSIS — Z79.899 HIGH RISK MEDICATION USE: ICD-10-CM

## 2018-05-07 DIAGNOSIS — M79.10 MYALGIA: ICD-10-CM

## 2018-05-07 DIAGNOSIS — J44.9 CHRONIC OBSTRUCTIVE PULMONARY DISEASE, UNSPECIFIED COPD TYPE (HCC): Primary | ICD-10-CM

## 2018-05-07 PROCEDURE — 99213 OFFICE O/P EST LOW 20 MIN: CPT | Performed by: FAMILY MEDICINE

## 2018-05-07 NOTE — PROGRESS NOTES
HPI  Miguelina Cruz is a 57 y.o. female who is here for follow up especially of fibromyalgia and chronic pain which is well controlled by current regimen.  Patient does report some cough and shortness of air which seems to be allergy related.  She remains on daily inhaler for her COPD.  Discontinued smoking 4 years ago.      Review of Systems   Constitutional: Negative for chills, diaphoresis, fever and unexpected weight change.   Respiratory: Positive for shortness of breath. Negative for wheezing.    Allergic/Immunologic: Positive for environmental allergies.   All other systems reviewed and are negative.        Past Medical History:   Diagnosis Date   • ADHD    • COPD (chronic obstructive pulmonary disease)    • Fibromyalgia        Past Surgical History:   Procedure Laterality Date   • FOOT SURGERY         Family History   Problem Relation Age of Onset   • Lung disease Mother    • Other Other      malignant neoplasm   • Other Other      malignant neoplasm       Social History     Social History   • Marital status: Single     Spouse name: N/A   • Number of children: N/A   • Years of education: N/A     Occupational History   • Not on file.     Social History Main Topics   • Smoking status: Former Smoker   • Smokeless tobacco: Not on file   • Alcohol use Yes      Comment: OCCASIONAL   • Drug use: No   • Sexual activity: No     Other Topics Concern   • Not on file     Social History Narrative   • No narrative on file         Physical Exam   Constitutional: She is oriented to person, place, and time. She appears well-developed and well-nourished. No distress.   HENT:   Head: Normocephalic and atraumatic.   Nose: Nose normal.   Mouth/Throat: Oropharynx is clear and moist.   Eyes: Conjunctivae and EOM are normal. Pupils are equal, round, and reactive to light.   Neck: Normal range of motion. No thyromegaly present.   Cardiovascular: Normal rate and regular rhythm.    Pulmonary/Chest: Effort normal. No respiratory  distress. She has no wheezes. She has no rales.   Abdominal: Soft. She exhibits no distension. There is no tenderness.   Musculoskeletal: She exhibits no edema or deformity.   Lymphadenopathy:     She has no cervical adenopathy.   Neurological: She is alert and oriented to person, place, and time. She exhibits normal muscle tone. Coordination normal.   Skin: Skin is warm and dry.   Psychiatric: She has a normal mood and affect. Her behavior is normal. Judgment and thought content normal.   Nursing note and vitals reviewed.        Assessment/Plan    Miguelina was seen today for copd, add, allergies, shortness of breath and cough.    Diagnoses and all orders for this visit:    Chronic obstructive pulmonary disease, unspecified COPD type    High risk medication use    Myalgia    History of cigarette smoking    Patient is here mostly for follow-up of pain medication.  Patient has chronic myalgia condition which is well controlled with current regimen.  There is no evidence of misuse or abuse of medication.  Does report some cough and congestion for the last few weeks which seems to be allergic in nature.  Overall controlled with symptomatic therapy.  Seems to be seasonal and improving.  Discussed over-the-counter antihistamine trial but avoiding decongestants.  Patient does use rescue inhaler as needed as well as daily maintenance inhaler.  Overall doing well on current regimen.  Follow-up in 3 months.    This note includes information entered using a voice recognition dictation system.  Though reviewed, some nonsensible errors may remain.

## 2018-05-10 ENCOUNTER — TELEPHONE (OUTPATIENT)
Dept: FAMILY MEDICINE CLINIC | Facility: CLINIC | Age: 58
End: 2018-05-10

## 2018-05-10 DIAGNOSIS — M79.10 MYALGIA: ICD-10-CM

## 2018-05-10 RX ORDER — HYDROCODONE BITARTRATE AND ACETAMINOPHEN 10; 325 MG/1; MG/1
1 TABLET ORAL EVERY 6 HOURS PRN
Qty: 100 TABLET | Refills: 0 | Status: SHIPPED | OUTPATIENT
Start: 2018-05-10 | End: 2018-06-05 | Stop reason: SDUPTHER

## 2018-06-04 ENCOUNTER — TELEPHONE (OUTPATIENT)
Dept: FAMILY MEDICINE CLINIC | Facility: CLINIC | Age: 58
End: 2018-06-04

## 2018-06-04 NOTE — TELEPHONE ENCOUNTER
No future O.V. Appt.    Last O.V. 05/07/2018.  Braxton 03/26/2018  Filled 05/13/2018.    THANK YOU.    ----- Message from Radha Peralta sent at 6/4/2018  9:41 AM EDT -----  PT NEEDS WRITTEN RX FOR:  #HYDROCODONE 10/325 #100  CALL [phone] WHEN READY

## 2018-06-05 DIAGNOSIS — M79.10 MYALGIA: ICD-10-CM

## 2018-06-05 RX ORDER — HYDROCODONE BITARTRATE AND ACETAMINOPHEN 10; 325 MG/1; MG/1
1 TABLET ORAL EVERY 6 HOURS PRN
Qty: 100 TABLET | Refills: 0 | Status: SHIPPED | OUTPATIENT
Start: 2018-06-05 | End: 2018-06-27 | Stop reason: SDUPTHER

## 2018-06-13 DIAGNOSIS — J44.9 CHRONIC OBSTRUCTIVE PULMONARY DISEASE, UNSPECIFIED COPD TYPE (HCC): ICD-10-CM

## 2018-06-27 ENCOUNTER — TELEPHONE (OUTPATIENT)
Dept: FAMILY MEDICINE CLINIC | Facility: CLINIC | Age: 58
End: 2018-06-27

## 2018-06-27 DIAGNOSIS — M79.10 MYALGIA: ICD-10-CM

## 2018-06-27 RX ORDER — HYDROCODONE BITARTRATE AND ACETAMINOPHEN 10; 325 MG/1; MG/1
1 TABLET ORAL EVERY 6 HOURS PRN
Qty: 100 TABLET | Refills: 0 | Status: SHIPPED | OUTPATIENT
Start: 2018-06-27 | End: 2018-07-19 | Stop reason: SDUPTHER

## 2018-06-27 NOTE — TELEPHONE ENCOUNTER
No future O.V.     Last O.V. 05/07/2018.  Braxton 06/02/2018.  Filled 06/05/2018.    Thank you.    ----- Message from Candace Goldberg sent at 6/27/2018  9:19 AM EDT -----  HYDROcodone-acetaminophen (NORCO)  MG per tablet   Take 1 tablet by mouth Every 6 (Six) Hours As Needed for Moderate Pain .      PLEASE CALL [phone] WHEN READY

## 2018-07-19 ENCOUNTER — TELEPHONE (OUTPATIENT)
Dept: FAMILY MEDICINE CLINIC | Facility: CLINIC | Age: 58
End: 2018-07-19

## 2018-07-19 DIAGNOSIS — M79.10 MYALGIA: ICD-10-CM

## 2018-07-19 RX ORDER — HYDROCODONE BITARTRATE AND ACETAMINOPHEN 10; 325 MG/1; MG/1
1 TABLET ORAL EVERY 6 HOURS PRN
Qty: 100 TABLET | Refills: 0 | Status: SHIPPED | OUTPATIENT
Start: 2018-07-19 | End: 2018-08-08 | Stop reason: SDUPTHER

## 2018-07-19 NOTE — TELEPHONE ENCOUNTER
Future O.V. 08/06/2018.    Last O.V. 05/07/2018.  Braxton 06/02/2018.  Filled 06/27/2018.    Thank you.    ----- Message from Sapphire Talbert sent at 7/19/2018 11:01 AM EDT -----  HYDROcodone-acetaminophen (NORCO)  MG per tablet   Sig: Take 1 tablet by mouth Every 6 (Six) Hours As Needed for Moderate Pain .

## 2018-08-06 DIAGNOSIS — J44.9 CHRONIC OBSTRUCTIVE PULMONARY DISEASE, UNSPECIFIED COPD TYPE (HCC): ICD-10-CM

## 2018-08-08 ENCOUNTER — OFFICE VISIT (OUTPATIENT)
Dept: FAMILY MEDICINE CLINIC | Facility: CLINIC | Age: 58
End: 2018-08-08

## 2018-08-08 VITALS
HEIGHT: 65 IN | SYSTOLIC BLOOD PRESSURE: 126 MMHG | DIASTOLIC BLOOD PRESSURE: 76 MMHG | WEIGHT: 145.4 LBS | TEMPERATURE: 98.8 F | OXYGEN SATURATION: 99 % | RESPIRATION RATE: 16 BRPM | BODY MASS INDEX: 24.22 KG/M2 | HEART RATE: 88 BPM

## 2018-08-08 DIAGNOSIS — J44.9 CHRONIC OBSTRUCTIVE PULMONARY DISEASE, UNSPECIFIED COPD TYPE (HCC): ICD-10-CM

## 2018-08-08 DIAGNOSIS — J06.9 ACUTE URI: Primary | ICD-10-CM

## 2018-08-08 DIAGNOSIS — Z79.899 HIGH RISK MEDICATION USE: ICD-10-CM

## 2018-08-08 DIAGNOSIS — M79.10 MYALGIA: ICD-10-CM

## 2018-08-08 PROCEDURE — 99213 OFFICE O/P EST LOW 20 MIN: CPT | Performed by: FAMILY MEDICINE

## 2018-08-08 RX ORDER — ESCITALOPRAM OXALATE 5 MG/1
5 TABLET ORAL DAILY
Refills: 0 | COMMUNITY
Start: 2018-08-06 | End: 2018-11-12

## 2018-08-08 RX ORDER — HYDROCODONE BITARTRATE AND ACETAMINOPHEN 10; 325 MG/1; MG/1
1 TABLET ORAL EVERY 6 HOURS PRN
Qty: 100 TABLET | Refills: 0 | Status: SHIPPED | OUTPATIENT
Start: 2018-08-08 | End: 2018-09-04 | Stop reason: SDUPTHER

## 2018-08-08 RX ORDER — ALBUTEROL SULFATE 90 UG/1
2 AEROSOL, METERED RESPIRATORY (INHALATION) EVERY 4 HOURS PRN
Qty: 1 INHALER | Refills: 6 | Status: SHIPPED | OUTPATIENT
Start: 2018-08-08 | End: 2019-02-12 | Stop reason: SDUPTHER

## 2018-08-08 NOTE — PROGRESS NOTES
MARGARITA Cruz is a 58 y.o. female who is here for follow up of chronic fibromyalgia pain well controlled with current medication.  Has a new psychiatrist at [facility] and has had some medication adjustments.  Last week had upper respiratory infection but earlier this week began to improve without specific therapy.  Patient remains off cigarettes.      Review of Systems   Constitutional: Negative for chills, diaphoresis, fever and unexpected weight change.   HENT: Positive for congestion, rhinorrhea, sinus pressure and sore throat.    Respiratory: Positive for cough.    All other systems reviewed and are negative.        Past Medical History:   Diagnosis Date   • ADHD    • COPD (chronic obstructive pulmonary disease) (CMS/HCC)    • Fibromyalgia        Past Surgical History:   Procedure Laterality Date   • FOOT SURGERY         Family History   Problem Relation Age of Onset   • Lung disease Mother    • Other Other         malignant neoplasm   • Other Other         malignant neoplasm       Social History     Social History   • Marital status: Single     Spouse name: N/A   • Number of children: N/A   • Years of education: N/A     Occupational History   • Not on file.     Social History Main Topics   • Smoking status: Former Smoker   • Smokeless tobacco: Not on file   • Alcohol use Yes      Comment: OCCASIONAL   • Drug use: No   • Sexual activity: No     Other Topics Concern   • Not on file     Social History Narrative   • No narrative on file         Physical Exam   Constitutional: She is oriented to person, place, and time. She appears well-developed and well-nourished. No distress.   HENT:   Head: Normocephalic.   Right Ear: Hearing, tympanic membrane and ear canal normal.   Left Ear: Hearing, tympanic membrane and ear canal normal.   Mouth/Throat: Uvula is midline, oropharynx is clear and moist and mucous membranes are normal. Tonsils are 0 on the right. Tonsils are 0 on the left. No tonsillar exudate.    Eyes: Pupils are equal, round, and reactive to light. Conjunctivae and EOM are normal.   Neck: Normal range of motion. No thyromegaly present.   Cardiovascular: Normal rate and regular rhythm.    Pulmonary/Chest: Effort normal and breath sounds normal. No respiratory distress.   Abdominal: Soft. She exhibits no distension and no mass.   Musculoskeletal: Normal range of motion. She exhibits no edema or deformity.   Lymphadenopathy:     She has no cervical adenopathy.   Neurological: She is alert and oriented to person, place, and time. Coordination normal.   Skin: Skin is warm and dry.   Psychiatric: She has a normal mood and affect. Her behavior is normal. Judgment and thought content normal.   Nursing note and vitals reviewed.        Assessment/Plan    Miguelina was seen today for copd and add.    Diagnoses and all orders for this visit:    Acute URI    Chronic obstructive pulmonary disease, unspecified COPD type (CMS/HCC)  -     umeclidinium-vilanterol (ANORO ELLIPTA) 62.5-25 MCG/INH aerosol powder  inhaler; Inhale 1 puff Daily.  -     albuterol (PROVENTIL HFA;VENTOLIN HFA) 108 (90 Base) MCG/ACT inhaler; Inhale 2 puffs Every 4 (Four) Hours As Needed for Wheezing.    Myalgia  -     HYDROcodone-acetaminophen (NORCO)  MG per tablet; Take 1 tablet by mouth Every 6 (Six) Hours As Needed for Moderate Pain .    High risk medication use      Patient here mostly for follow-up of chronic musculoskeletal pain well controlled with current pain regimen.  Patient remains very functional and is doing well on current regimen.  No evidence of misuse nor abuse of medication.  Did have a recent upper respiratory infection but this resolved with symptomatic therapy only.  Patient reports she remains off cigarettes.  Continue current therapy including close monitoring every 3 months.    This note includes information entered using a voice recognition dictation system.  Though reviewed, some nonsensible errors may remain.

## 2018-09-04 ENCOUNTER — TELEPHONE (OUTPATIENT)
Dept: FAMILY MEDICINE CLINIC | Facility: CLINIC | Age: 58
End: 2018-09-04

## 2018-09-04 DIAGNOSIS — M79.10 MYALGIA: ICD-10-CM

## 2018-09-04 RX ORDER — HYDROCODONE BITARTRATE AND ACETAMINOPHEN 10; 325 MG/1; MG/1
1 TABLET ORAL EVERY 6 HOURS PRN
Qty: 100 TABLET | Refills: 0 | Status: SHIPPED | OUTPATIENT
Start: 2018-09-04 | End: 2018-09-28 | Stop reason: SDUPTHER

## 2018-09-04 NOTE — TELEPHONE ENCOUNTER
Last OV 8/18/18  Last written 8/8/18  william 8/6/18        ----- Message from Radha Peralta sent at 9/4/2018  9:36 AM EDT -----  PT NEEDS WRITTEN RX FOR:  #HYDROCODONE 10/325 1 Q6HRS #100  PLEASE CALL [phone] WHEN READY

## 2018-09-27 ENCOUNTER — TELEPHONE (OUTPATIENT)
Dept: FAMILY MEDICINE CLINIC | Facility: CLINIC | Age: 58
End: 2018-09-27

## 2018-09-27 NOTE — TELEPHONE ENCOUNTER
norco  mg take one tab every 6 hours as needed for moderate pain    Written 9/4/18 #100  william 8/6/18  Last ov 8/8/18

## 2018-09-28 ENCOUNTER — TELEPHONE (OUTPATIENT)
Dept: FAMILY MEDICINE CLINIC | Facility: CLINIC | Age: 58
End: 2018-09-28

## 2018-09-28 DIAGNOSIS — M79.10 MYALGIA: ICD-10-CM

## 2018-09-28 RX ORDER — HYDROCODONE BITARTRATE AND ACETAMINOPHEN 10; 325 MG/1; MG/1
1 TABLET ORAL EVERY 6 HOURS PRN
Qty: 100 TABLET | Refills: 0 | Status: SHIPPED | OUTPATIENT
Start: 2018-09-28 | End: 2018-10-18 | Stop reason: SDUPTHER

## 2018-09-28 NOTE — TELEPHONE ENCOUNTER
PATIENT IS HERE IN WAITING AREAD.    norco  mg take one tab every 6 hours as needed for moderate pain     Written 9/4/18 #100  william 8/6/18  Last ov 8/8/18

## 2018-10-18 ENCOUNTER — TELEPHONE (OUTPATIENT)
Dept: FAMILY MEDICINE CLINIC | Facility: CLINIC | Age: 58
End: 2018-10-18

## 2018-10-18 DIAGNOSIS — M79.10 MYALGIA: ICD-10-CM

## 2018-10-18 RX ORDER — HYDROCODONE BITARTRATE AND ACETAMINOPHEN 10; 325 MG/1; MG/1
1 TABLET ORAL EVERY 6 HOURS PRN
Qty: 100 TABLET | Refills: 0 | Status: SHIPPED | OUTPATIENT
Start: 2018-10-18 | End: 2018-11-12 | Stop reason: SDUPTHER

## 2018-10-18 NOTE — TELEPHONE ENCOUNTER
rf request for hydrocodone  Take one every 6 hours  Last rf 9/28/18    william 8/6/18  Ov 8/8/2018

## 2018-11-12 ENCOUNTER — OFFICE VISIT (OUTPATIENT)
Dept: FAMILY MEDICINE CLINIC | Facility: CLINIC | Age: 58
End: 2018-11-12

## 2018-11-12 VITALS
OXYGEN SATURATION: 97 % | BODY MASS INDEX: 25.33 KG/M2 | SYSTOLIC BLOOD PRESSURE: 138 MMHG | WEIGHT: 152 LBS | TEMPERATURE: 98.6 F | DIASTOLIC BLOOD PRESSURE: 74 MMHG | HEIGHT: 65 IN | RESPIRATION RATE: 16 BRPM | HEART RATE: 94 BPM

## 2018-11-12 DIAGNOSIS — M79.10 MYALGIA: ICD-10-CM

## 2018-11-12 DIAGNOSIS — Z87.891 HISTORY OF CIGARETTE SMOKING: ICD-10-CM

## 2018-11-12 DIAGNOSIS — J44.9 CHRONIC OBSTRUCTIVE PULMONARY DISEASE, UNSPECIFIED COPD TYPE (HCC): Primary | ICD-10-CM

## 2018-11-12 DIAGNOSIS — Z79.899 HIGH RISK MEDICATION USE: ICD-10-CM

## 2018-11-12 PROCEDURE — 99213 OFFICE O/P EST LOW 20 MIN: CPT | Performed by: FAMILY MEDICINE

## 2018-11-12 RX ORDER — HYDROCODONE BITARTRATE AND ACETAMINOPHEN 10; 325 MG/1; MG/1
1 TABLET ORAL EVERY 6 HOURS PRN
Qty: 100 TABLET | Refills: 0 | Status: SHIPPED | OUTPATIENT
Start: 2018-11-12 | End: 2018-12-05 | Stop reason: SDUPTHER

## 2018-11-12 RX ORDER — INFLUENZA A VIRUS A/MICHIGAN/45/2015 X-275 (H1N1) ANTIGEN (FORMALDEHYDE INACTIVATED), INFLUENZA A VIRUS A/SINGAPORE/INFIMH-16-0019/2016 IVR-186 (H3N2) ANTIGEN (FORMALDEHYDE INACTIVATED), INFLUENZA B VIRUS B/PHUKET/3073/2013 ANTIGEN (FORMALDEHYDE INACTIVATED), AND INFLUENZA B VIRUS B/MARYLAND/15/2016 BX-69A ANTIGEN (FORMALDEHYDE INACTIVATED) 15; 15; 15; 15 UG/.5ML; UG/.5ML; UG/.5ML; UG/.5ML
INJECTION, SUSPENSION INTRAMUSCULAR
Refills: 0 | COMMUNITY
Start: 2018-10-02 | End: 2018-11-12

## 2018-11-12 RX ORDER — MIRTAZAPINE 15 MG/1
15 TABLET, FILM COATED ORAL NIGHTLY
COMMUNITY
End: 2019-05-10

## 2018-11-12 NOTE — PROGRESS NOTES
HPI  Miguelina Cruz is a 58 y.o. female who is here for follow up of COPD and fibromyalgia.  Psychiatrist recently changed antidepressant therapy which has been causing some sleepiness.  Overall seems to be doing very well on current regimen.      Review of Systems   Constitutional: Positive for fever. Negative for unexpected weight change.        Reports several days of low-grade fever but resolved 3 weeks ago and has not recurred   Respiratory: Positive for shortness of breath. Negative for cough, chest tightness and wheezing.    Cardiovascular: Negative for chest pain.   Musculoskeletal: Positive for myalgias.   Psychiatric/Behavioral: Positive for dysphoric mood. Negative for sleep disturbance.   All other systems reviewed and are negative.        Past Medical History:   Diagnosis Date   • ADHD    • COPD (chronic obstructive pulmonary disease) (CMS/HCC)    • Fibromyalgia        Past Surgical History:   Procedure Laterality Date   • FOOT SURGERY         Family History   Problem Relation Age of Onset   • Lung disease Mother    • Other Other         malignant neoplasm   • Other Other         malignant neoplasm       Social History     Socioeconomic History   • Marital status: Single     Spouse name: Not on file   • Number of children: Not on file   • Years of education: Not on file   • Highest education level: Not on file   Social Needs   • Financial resource strain: Not on file   • Food insecurity - worry: Not on file   • Food insecurity - inability: Not on file   • Transportation needs - medical: Not on file   • Transportation needs - non-medical: Not on file   Occupational History   • Not on file   Tobacco Use   • Smoking status: Former Smoker   Substance and Sexual Activity   • Alcohol use: Yes     Comment: OCCASIONAL   • Drug use: No   • Sexual activity: No   Other Topics Concern   • Not on file   Social History Narrative   • Not on file         Physical Exam   Constitutional: She is oriented to person, place,  and time. She appears well-developed and well-nourished. No distress.   HENT:   Head: Normocephalic and atraumatic.   Nose: Nose normal.   Mouth/Throat: Oropharynx is clear and moist.   Eyes: Conjunctivae and EOM are normal. Pupils are equal, round, and reactive to light.   Neck: Normal range of motion. No thyromegaly present.   Cardiovascular: Normal rate and regular rhythm.   Pulmonary/Chest: Effort normal and breath sounds normal. No respiratory distress. She has no wheezes. She has no rales.   Abdominal: Soft. She exhibits no distension. There is no tenderness.   Musculoskeletal: She exhibits no edema or deformity.   Lymphadenopathy:     She has no cervical adenopathy.   Neurological: She is alert and oriented to person, place, and time. Coordination normal.   Skin: Skin is warm and dry.   Psychiatric: She has a normal mood and affect. Her behavior is normal. Judgment and thought content normal.   Nursing note and vitals reviewed.        Assessment/Plan    Miguelina was seen today for copd and add.    Diagnoses and all orders for this visit:    Chronic obstructive pulmonary disease, unspecified COPD type (CMS/HCC)  -     Full Pulmonary Function Test With Bronchodilator; Future    Myalgia  -     HYDROcodone-acetaminophen (NORCO)  MG per tablet; Take 1 tablet by mouth Every 6 (Six) Hours As Needed for Moderate Pain .    High risk medication use    History of cigarette smoking  -     Full Pulmonary Function Test With Bronchodilator; Future      Patient is here for routine follow-up of above-noted medical problems.  Continues to function very well on current regimen including medication from her psychiatrist.  Has fibromyalgia type pain which is well controlled with current pain medication and there is no evidence of misuse nor abuse.  We'll continue current regimen and monitor closely every 3 months.  Known COPD and uses occasional rescue inhaler.  Remains off cigarettes.  Discussed getting repeat pulmonary  function tests since last done greater than 2 years ago.    This note includes information entered using a voice recognition dictation system.  Though reviewed, some nonsensible errors may remain.

## 2018-11-19 ENCOUNTER — HOSPITAL ENCOUNTER (OUTPATIENT)
Dept: RESPIRATORY THERAPY | Facility: HOSPITAL | Age: 58
Discharge: HOME | End: 2018-11-19
Attending: FAMILY MEDICINE | Admitting: FAMILY MEDICINE

## 2018-11-19 DIAGNOSIS — Z87.891 HISTORY OF CIGARETTE SMOKING: ICD-10-CM

## 2018-11-19 DIAGNOSIS — J44.9 CHRONIC OBSTRUCTIVE PULMONARY DISEASE, UNSPECIFIED COPD TYPE (HCC): ICD-10-CM

## 2018-11-19 PROCEDURE — 94060 EVALUATION OF WHEEZING: CPT

## 2018-11-19 PROCEDURE — 94726 PLETHYSMOGRAPHY LUNG VOLUMES: CPT

## 2018-11-19 PROCEDURE — 94729 DIFFUSING CAPACITY: CPT

## 2018-11-19 PROCEDURE — 94640 AIRWAY INHALATION TREATMENT: CPT

## 2018-11-19 RX ORDER — ALBUTEROL SULFATE 2.5 MG/3ML
2.5 SOLUTION RESPIRATORY (INHALATION) ONCE
Status: COMPLETED | OUTPATIENT
Start: 2018-11-19 | End: 2018-11-19

## 2018-11-19 RX ADMIN — ALBUTEROL SULFATE 2.5 MG: 2.5 SOLUTION RESPIRATORY (INHALATION) at 08:38

## 2018-12-05 ENCOUNTER — TELEPHONE (OUTPATIENT)
Dept: FAMILY MEDICINE CLINIC | Facility: CLINIC | Age: 58
End: 2018-12-05

## 2018-12-05 DIAGNOSIS — M79.10 MYALGIA: ICD-10-CM

## 2018-12-05 RX ORDER — HYDROCODONE BITARTRATE AND ACETAMINOPHEN 10; 325 MG/1; MG/1
1 TABLET ORAL EVERY 6 HOURS PRN
Qty: 100 TABLET | Refills: 0 | Status: SHIPPED | OUTPATIENT
Start: 2018-12-05 | End: 2018-12-27 | Stop reason: SDUPTHER

## 2018-12-05 NOTE — TELEPHONE ENCOUNTER
Future O.V. 02/12/2019.     Last O.V. 11/12/2018.  Braxton 08/06/2018 (UPDATED).  Filled 11/13/2018.     Thank you.       ----- Message from Radha Peralta sent at 12/5/2018 11:23 AM EST -----  PT NEEDS WRITTEN RX:  #HYDROCODONE 10/325 #100  CALL [phone] WHEN READY

## 2018-12-27 ENCOUNTER — TELEPHONE (OUTPATIENT)
Dept: FAMILY MEDICINE CLINIC | Facility: CLINIC | Age: 58
End: 2018-12-27

## 2018-12-27 DIAGNOSIS — M79.10 MYALGIA: ICD-10-CM

## 2018-12-27 RX ORDER — HYDROCODONE BITARTRATE AND ACETAMINOPHEN 10; 325 MG/1; MG/1
1 TABLET ORAL EVERY 6 HOURS PRN
Qty: 100 TABLET | Refills: 0 | Status: SHIPPED | OUTPATIENT
Start: 2018-12-27 | End: 2019-01-18 | Stop reason: SDUPTHER

## 2018-12-27 NOTE — TELEPHONE ENCOUNTER
NARC CONTRACT on 2017.    Future O.V. 2019.     Last O.V. 2018.  Braxton 2018.  Filled 2018.     Thank you.     PT NEEDS WRITTEN RX:  #HYDROCODONE 10/325 #100  CALL [phone] WHEN READY

## 2018-12-27 NOTE — TELEPHONE ENCOUNTER
Phoned/notifie and is now aware when picking up [place] needs to renew NARC CONTRACT.    Thank you.

## 2019-01-18 ENCOUNTER — TELEPHONE (OUTPATIENT)
Dept: FAMILY MEDICINE CLINIC | Facility: CLINIC | Age: 59
End: 2019-01-18

## 2019-01-18 DIAGNOSIS — M79.10 MYALGIA: ICD-10-CM

## 2019-01-18 RX ORDER — HYDROCODONE BITARTRATE AND ACETAMINOPHEN 10; 325 MG/1; MG/1
1 TABLET ORAL EVERY 6 HOURS PRN
Qty: 100 TABLET | Refills: 0 | Status: SHIPPED | OUTPATIENT
Start: 2019-01-18 | End: 2019-02-12 | Stop reason: SDUPTHER

## 2019-01-18 NOTE — TELEPHONE ENCOUNTER
NARC CONTRACT on 12/27/2018.     Future O.V. 02/12/2019.     Last O.V. 11/12/2018.  Braxton 12/03/2018.  Filled 12/27/2018.     Thank you.     PT NEEDS WRITTEN RX:  #HYDROCODONE 10/325 #100  CALL [phone] WHEN READY

## 2019-02-12 ENCOUNTER — OFFICE VISIT (OUTPATIENT)
Dept: FAMILY MEDICINE CLINIC | Facility: CLINIC | Age: 59
End: 2019-02-12

## 2019-02-12 VITALS
RESPIRATION RATE: 16 BRPM | OXYGEN SATURATION: 93 % | SYSTOLIC BLOOD PRESSURE: 134 MMHG | HEIGHT: 65 IN | WEIGHT: 162 LBS | HEART RATE: 80 BPM | TEMPERATURE: 98.5 F | DIASTOLIC BLOOD PRESSURE: 68 MMHG | BODY MASS INDEX: 26.99 KG/M2

## 2019-02-12 DIAGNOSIS — F98.8 ATTENTION DEFICIT DISORDER, UNSPECIFIED HYPERACTIVITY PRESENCE: Primary | ICD-10-CM

## 2019-02-12 DIAGNOSIS — Z23 IMMUNIZATION DUE: ICD-10-CM

## 2019-02-12 DIAGNOSIS — J44.9 CHRONIC OBSTRUCTIVE PULMONARY DISEASE, UNSPECIFIED COPD TYPE (HCC): ICD-10-CM

## 2019-02-12 DIAGNOSIS — M79.10 MYALGIA: ICD-10-CM

## 2019-02-12 DIAGNOSIS — Z79.899 HIGH RISK MEDICATION USE: ICD-10-CM

## 2019-02-12 PROCEDURE — 99214 OFFICE O/P EST MOD 30 MIN: CPT | Performed by: FAMILY MEDICINE

## 2019-02-12 PROCEDURE — 90471 IMMUNIZATION ADMIN: CPT | Performed by: FAMILY MEDICINE

## 2019-02-12 PROCEDURE — 90715 TDAP VACCINE 7 YRS/> IM: CPT | Performed by: FAMILY MEDICINE

## 2019-02-12 RX ORDER — HYDROCODONE BITARTRATE AND ACETAMINOPHEN 10; 325 MG/1; MG/1
1 TABLET ORAL EVERY 6 HOURS PRN
Qty: 100 TABLET | Refills: 0 | Status: SHIPPED | OUTPATIENT
Start: 2019-02-12 | End: 2019-03-06 | Stop reason: SDUPTHER

## 2019-02-12 RX ORDER — ALBUTEROL SULFATE 90 UG/1
2 AEROSOL, METERED RESPIRATORY (INHALATION) EVERY 4 HOURS PRN
Qty: 1 INHALER | Refills: 6 | Status: SHIPPED | OUTPATIENT
Start: 2019-02-12 | End: 2019-12-17 | Stop reason: SDUPTHER

## 2019-02-12 NOTE — PROGRESS NOTES
HPI  Miguelina Cruz is a 58 y.o. female who is here for follow up of COPD chronic pain related to fibromyalgia.  Has been off cigarettes for years.  Some recent weight gain but was started on a new antidepressant which very likely is a factor and this was discussed.  Does have a  grandchild and discussed recommendations for Tdap.  Did have tetanus booster she thinks 8 years ago at the [facility] emergency room.  Of course as usual unable to access those records.  Discussed recent pulmonary function tests.  Continues to have shortness of breath on exertion and has been using incentive spirometer that she has at home.  This was discussed and recommended but especially remaining off cigarettes etc.      Review of Systems   Constitutional: Positive for unexpected weight change.   Respiratory: Positive for shortness of breath. Negative for cough and wheezing.    All other systems reviewed and are negative.        Past Medical History:   Diagnosis Date   • ADHD    • COPD (chronic obstructive pulmonary disease) (CMS/HCC)    • Fibromyalgia        Past Surgical History:   Procedure Laterality Date   • FOOT SURGERY         Family History   Problem Relation Age of Onset   • Lung disease Mother    • Other Other         malignant neoplasm   • Other Other         malignant neoplasm       Social History     Socioeconomic History   • Marital status: Single     Spouse name: Not on file   • Number of children: Not on file   • Years of education: Not on file   • Highest education level: Not on file   Social Needs   • Financial resource strain: Not on file   • Food insecurity - worry: Not on file   • Food insecurity - inability: Not on file   • Transportation needs - medical: Not on file   • Transportation needs - non-medical: Not on file   Occupational History   • Not on file   Tobacco Use   • Smoking status: Former Smoker   Substance and Sexual Activity   • Alcohol use: Yes     Comment: OCCASIONAL   • Drug use: No   • Sexual  activity: No   Other Topics Concern   • Not on file   Social History Narrative   • Not on file         Physical Exam   Constitutional: She is oriented to person, place, and time. She appears well-developed and well-nourished. No distress.   HENT:   Head: Normocephalic and atraumatic.   Nose: Nose normal.   Mouth/Throat: Oropharynx is clear and moist. No oropharyngeal exudate.   Eyes: Conjunctivae and EOM are normal. Pupils are equal, round, and reactive to light.   Neck: Normal range of motion.   Cardiovascular: Normal rate, regular rhythm and normal heart sounds.   Pulmonary/Chest: Effort normal. No respiratory distress. She has no wheezes. She has no rales.   Abdominal: Soft. She exhibits no distension. There is no tenderness.   Musculoskeletal: She exhibits no edema or deformity.   Neurological: She is alert and oriented to person, place, and time. She exhibits normal muscle tone. Coordination normal.   Skin: Skin is warm and dry.   Psychiatric: She has a normal mood and affect. Her behavior is normal. Judgment and thought content normal.   Nursing note and vitals reviewed.        Assessment/Plan    Miguelina was seen today for copd and add.    Diagnoses and all orders for this visit:    Attention deficit disorder, unspecified hyperactivity presence    Chronic obstructive pulmonary disease, unspecified COPD type (CMS/[facility])  -     albuterol sulfate  (90 Base) MCG/ACT inhaler; Inhale 2 puffs Every 4 (Four) Hours As Needed for Wheezing.    Myalgia  -     HYDROcodone-acetaminophen (NORCO)  MG per tablet; Take 1 tablet by mouth Every 6 (Six) Hours As Needed for Moderate Pain .    High risk medication use      Patient here for routine follow-up of above-noted medical problems.  Continues with some shortness of breath and is on inhalers as noted.  This was again discussed and especially remaining off cigarettes.  Some weight gain noted possibly related to new antidepressant medicine and this was discussed.  Also  immunization updates and at some point should get shingles and possibly pneumonia vaccines but most likely should go through pharmacy where cost can be noted up front.  Will give update of vaccine for tetanus and pertussis especially with new grandchild.  Overall doing well on current regimen.  Continue with medication for pain control.  Patient remains very functional and there is no evidence of abuse nor abuse of medication.  Will continue to monitor closely with visits every 3 months.  Also discussed colon cancer screening and will check with insurance company to see if [employer] is covered.    This note includes information entered using a voice recognition dictation system.  Though reviewed, some nonsensible errors may remain.

## 2019-03-06 ENCOUNTER — TELEPHONE (OUTPATIENT)
Dept: FAMILY MEDICINE CLINIC | Facility: CLINIC | Age: 59
End: 2019-03-06

## 2019-03-06 DIAGNOSIS — M79.10 MYALGIA: ICD-10-CM

## 2019-03-06 RX ORDER — HYDROCODONE BITARTRATE AND ACETAMINOPHEN 10; 325 MG/1; MG/1
1 TABLET ORAL EVERY 6 HOURS PRN
Qty: 100 TABLET | Refills: 0 | Status: SHIPPED | OUTPATIENT
Start: 2019-03-06 | End: 2019-03-28 | Stop reason: SDUPTHER

## 2019-03-06 NOTE — TELEPHONE ENCOUNTER
Last rx & OV 2/12/19  william 1/2019    ----- Message from Sapphire Talbert sent at 3/6/2019 10:28 AM EST -----  HYDROcodone-acetaminophen (NORCO)  MG per tablet  Sig: Take 1 tablet by mouth Every 6 (Six) Hours As Needed for Moderate Pain .      Pharmacy:  [facility] AT [facility] & ([address])  [phone] [facility] [phone] FX

## 2019-03-28 DIAGNOSIS — M79.10 MYALGIA: ICD-10-CM

## 2019-03-28 RX ORDER — HYDROCODONE BITARTRATE AND ACETAMINOPHEN 10; 325 MG/1; MG/1
1 TABLET ORAL EVERY 6 HOURS PRN
Qty: 100 TABLET | Refills: 0 | Status: SHIPPED | OUTPATIENT
Start: 2019-03-28 | End: 2019-04-19 | Stop reason: SDUPTHER

## 2019-04-08 DIAGNOSIS — J44.9 CHRONIC OBSTRUCTIVE PULMONARY DISEASE, UNSPECIFIED COPD TYPE (HCC): Primary | ICD-10-CM

## 2019-04-19 ENCOUNTER — TELEPHONE (OUTPATIENT)
Dept: FAMILY MEDICINE CLINIC | Facility: CLINIC | Age: 59
End: 2019-04-19

## 2019-04-19 DIAGNOSIS — M79.10 MYALGIA: ICD-10-CM

## 2019-04-19 RX ORDER — HYDROCODONE BITARTRATE AND ACETAMINOPHEN 10; 325 MG/1; MG/1
1 TABLET ORAL EVERY 6 HOURS PRN
Qty: 100 TABLET | Refills: 0 | Status: SHIPPED | OUTPATIENT
Start: 2019-04-19 | End: 2019-05-10 | Stop reason: SDUPTHER

## 2019-04-19 NOTE — TELEPHONE ENCOUNTER
NARC CONTRACT on 12/27/2018.     No future O.V.     Last O.V. 02/12/2019.  Braxton  03/26/2019.  Filled 03/28/2019.     Thank you.    ----- Message from Radha Peralta sent at 4/19/2019  8:35 AM EDT -----  PT NEEDS A REFILL ON:  #HYDROCODONE 10/325 #100  PHARM#[phone] [facility]  PT'S#[phone]

## 2019-05-10 ENCOUNTER — OFFICE VISIT (OUTPATIENT)
Dept: FAMILY MEDICINE CLINIC | Facility: CLINIC | Age: 59
End: 2019-05-10

## 2019-05-10 ENCOUNTER — RESULTS ENCOUNTER (OUTPATIENT)
Dept: FAMILY MEDICINE CLINIC | Facility: CLINIC | Age: 59
End: 2019-05-10

## 2019-05-10 VITALS
HEIGHT: 65 IN | OXYGEN SATURATION: 96 % | HEART RATE: 99 BPM | BODY MASS INDEX: 25.69 KG/M2 | TEMPERATURE: 98.6 F | DIASTOLIC BLOOD PRESSURE: 80 MMHG | WEIGHT: 154.2 LBS | RESPIRATION RATE: 16 BRPM | SYSTOLIC BLOOD PRESSURE: 126 MMHG

## 2019-05-10 DIAGNOSIS — Z87.891 HISTORY OF CIGARETTE SMOKING: Primary | ICD-10-CM

## 2019-05-10 DIAGNOSIS — Z12.12 SCREENING FOR COLORECTAL CANCER: ICD-10-CM

## 2019-05-10 DIAGNOSIS — Z12.11 SCREENING FOR COLORECTAL CANCER: ICD-10-CM

## 2019-05-10 DIAGNOSIS — Z79.899 HIGH RISK MEDICATION USE: ICD-10-CM

## 2019-05-10 DIAGNOSIS — J44.9 CHRONIC OBSTRUCTIVE PULMONARY DISEASE, UNSPECIFIED COPD TYPE (HCC): ICD-10-CM

## 2019-05-10 DIAGNOSIS — M79.10 MYALGIA: ICD-10-CM

## 2019-05-10 PROCEDURE — 99214 OFFICE O/P EST MOD 30 MIN: CPT | Performed by: FAMILY MEDICINE

## 2019-05-10 RX ORDER — HYDROCODONE BITARTRATE AND ACETAMINOPHEN 10; 325 MG/1; MG/1
1 TABLET ORAL EVERY 6 HOURS PRN
Qty: 100 TABLET | Refills: 0 | Status: SHIPPED | OUTPATIENT
Start: 2019-05-10 | End: 2019-06-04 | Stop reason: SDUPTHER

## 2019-05-10 RX ORDER — HYDROXYZINE PAMOATE 25 MG/1
25 CAPSULE ORAL 3 TIMES DAILY PRN
Qty: 50 CAPSULE | Refills: 5
Start: 2019-05-10 | End: 2019-08-12

## 2019-05-10 NOTE — PROGRESS NOTES
HPI  Miguelina Cruz is a 58 y.o. female who is here for follow up of chronic pain especially muscular.  Continues follow-up with psychiatrist and recently had medication apparently for anxiety?  Had been on Remeron but had significant weight gain etc.  Continues with muscular pains but under adequate control with current regimen which will be continued.  Insurance indicates coverage of colon cancer screening and there is a very strong family history of cancer.  Also discussed screening CT scan since patient does have greater than 30-year history of smoking 1 pack/day.  Did discontinue cigarette use 5 years ago.      Review of Systems   All other systems reviewed and are negative.        Past Medical History:   Diagnosis Date   • ADHD    • COPD (chronic obstructive pulmonary disease) (CMS/HCC)    • Fibromyalgia        Past Surgical History:   Procedure Laterality Date   • FOOT SURGERY         Family History   Problem Relation Age of Onset   • Lung disease Mother    • Other Other         malignant neoplasm   • Other Other         malignant neoplasm       Social History     Socioeconomic History   • Marital status: Single     Spouse name: Not on file   • Number of children: Not on file   • Years of education: Not on file   • Highest education level: Not on file   Tobacco Use   • Smoking status: Former Smoker   Substance and Sexual Activity   • Alcohol use: Yes     Comment: OCCASIONAL   • Drug use: No     Types: Hydrocodone   • Sexual activity: No         Physical Exam   Constitutional: She is oriented to person, place, and time. She appears well-developed and well-nourished.   HENT:   Head: Normocephalic.   Nose: Nose normal.   Mouth/Throat: Oropharynx is clear and moist. No oropharyngeal exudate.   Eyes: Conjunctivae and EOM are normal. Pupils are equal, round, and reactive to light.   Neck: Normal range of motion. Neck supple.   Cardiovascular: Normal rate, regular rhythm and normal heart sounds.   Pulmonary/Chest:  Effort normal and breath sounds normal. No respiratory distress. She has no wheezes.   Abdominal: Soft.   Neurological: She is alert and oriented to person, place, and time. She exhibits normal muscle tone. Coordination normal.   Skin: Skin is warm and dry.   Psychiatric: She has a normal mood and affect. Her behavior is normal. Judgment and thought content normal.   Nursing note and vitals reviewed.        Assessment/Plan    Miguelina was seen today for add and copd.    Diagnoses and all orders for this visit:    History of cigarette smoking  -     Ambulatory Referral to Multi-Disciplinary Clinic    Myalgia  -     HYDROcodone-acetaminophen (NORCO)  MG per tablet; Take 1 tablet by mouth Every 6 (Six) Hours As Needed for Moderate Pain .    High risk medication use    Screening for colorectal cancer  -     Cologuard - Stool, Per Rectum; Future    Chronic obstructive pulmonary disease, unspecified COPD type (CMS/HCC)    Other orders  -     hydrOXYzine pamoate (VISTARIL) 25 MG capsule; Take 1 capsule by mouth 3 (Three) Times a Day As Needed for Itching.      Patient here mostly to continue pain medication for chronic muscle pains.  Continues with pain but functional on current regimen with no evidence of abuse nor misuse of medicine.  Strong family history of cancers and discussed screening for colon and especially lung cancer.  Above tests will be scheduled.  Otherwise continue current therapy including close monitoring of controlled medications every 3 months.    This note includes information entered using a voice recognition dictation system.  Though reviewed, some nonsensible errors may remain.

## 2019-05-23 ENCOUNTER — APPOINTMENT (OUTPATIENT)
Dept: OTHER | Facility: HOSPITAL | Age: 59
End: 2019-05-23

## 2019-05-23 ENCOUNTER — HOSPITAL ENCOUNTER (OUTPATIENT)
Dept: PET IMAGING | Facility: HOSPITAL | Age: 59
Discharge: HOME | End: 2019-05-23
Admitting: CLINICAL NURSE SPECIALIST

## 2019-05-23 DIAGNOSIS — Z87.891 HISTORY OF SMOKING 30 OR MORE PACK YEARS: ICD-10-CM

## 2019-05-23 DIAGNOSIS — Z12.2 SCREENING FOR MALIGNANT NEOPLASM OF RESPIRATORY ORGAN: Primary | ICD-10-CM

## 2019-05-23 DIAGNOSIS — Z12.2 SCREENING FOR MALIGNANT NEOPLASM OF RESPIRATORY ORGAN: ICD-10-CM

## 2019-05-23 PROCEDURE — G0297 LDCT FOR LUNG CA SCREEN: HCPCS

## 2019-06-04 ENCOUNTER — TELEPHONE (OUTPATIENT)
Dept: FAMILY MEDICINE CLINIC | Facility: CLINIC | Age: 59
End: 2019-06-04

## 2019-06-04 DIAGNOSIS — M79.10 MYALGIA: ICD-10-CM

## 2019-06-04 RX ORDER — HYDROCODONE BITARTRATE AND ACETAMINOPHEN 10; 325 MG/1; MG/1
1 TABLET ORAL EVERY 6 HOURS PRN
Qty: 100 TABLET | Refills: 0 | Status: SHIPPED | OUTPATIENT
Start: 2019-06-04 | End: 2019-06-26 | Stop reason: SDUPTHER

## 2019-06-04 NOTE — TELEPHONE ENCOUNTER
NARC CONTRACT on 12/27/2018.     Future O.V. 08/09/2019.     Last O.V. 05/10/2019.  Braxton  03/26/2019.  Filled 05/10/2019.     Thank you.    ----- Message from Radha Peralta sent at 6/4/2019  9:48 AM EDT -----  PT NEEDS REFILL ON:  #HYDROCODONE 10/325#100  PHARM#[phone] [facility]  PT'S#[phone]

## 2019-06-26 DIAGNOSIS — M79.10 MYALGIA: ICD-10-CM

## 2019-06-26 RX ORDER — HYDROCODONE BITARTRATE AND ACETAMINOPHEN 10; 325 MG/1; MG/1
1 TABLET ORAL EVERY 6 HOURS PRN
Qty: 100 TABLET | Refills: 0 | Status: SHIPPED | OUTPATIENT
Start: 2019-06-26 | End: 2019-07-19 | Stop reason: SDUPTHER

## 2019-06-26 NOTE — TELEPHONE ENCOUNTER
Last ov 5/10/19  Last william 3/26/19  Ok to refill ?    ----- Message from Sapphire Talbert sent at 6/26/2019  9:24 AM EDT -----  HYDROcodone-acetaminophen (NORCO)  MG per tablet  Sig: Take 1 tablet by mouth Every 6 (Six) Hours As Needed for Moderate Pain .        Pharmacy:  [facility] AT [facility] & ([facility]) - [phone] [facility] [phone]

## 2019-07-19 ENCOUNTER — TELEPHONE (OUTPATIENT)
Dept: FAMILY MEDICINE CLINIC | Facility: CLINIC | Age: 59
End: 2019-07-19

## 2019-07-19 DIAGNOSIS — M79.10 MYALGIA: ICD-10-CM

## 2019-07-19 RX ORDER — HYDROCODONE BITARTRATE AND ACETAMINOPHEN 10; 325 MG/1; MG/1
1 TABLET ORAL EVERY 6 HOURS PRN
Qty: 100 TABLET | Refills: 0 | Status: SHIPPED | OUTPATIENT
Start: 2019-07-19 | End: 2019-08-12 | Stop reason: SDUPTHER

## 2019-07-19 NOTE — TELEPHONE ENCOUNTER
NARC CONTRACT on 12/27/2018.     Future O.V. 08/09/2019.     Last O.V. 05/10/2019.  Braxton  03/26/2019.  Filled 06/26/2019.     Thank you.    ----- Message from Sapphire Talbert sent at 7/19/2019  9:52 AM EDT -----  HYDROcodone-acetaminophen (NORCO)  MG per tablet   Sig: Take 1 tablet by mouth Every 6 (Six) Hours As Needed for Moderate Pain .      Pharmacy:  [facility]ANTHA TINOCO[address] AT [facility] & ([facility]) - [phone] [facility] [phone] FX

## 2019-08-12 ENCOUNTER — OFFICE VISIT (OUTPATIENT)
Dept: FAMILY MEDICINE CLINIC | Facility: CLINIC | Age: 59
End: 2019-08-12

## 2019-08-12 VITALS
TEMPERATURE: 98.3 F | HEIGHT: 65 IN | DIASTOLIC BLOOD PRESSURE: 80 MMHG | OXYGEN SATURATION: 98 % | HEART RATE: 105 BPM | BODY MASS INDEX: 26.16 KG/M2 | WEIGHT: 157 LBS | SYSTOLIC BLOOD PRESSURE: 140 MMHG | RESPIRATION RATE: 16 BRPM

## 2019-08-12 DIAGNOSIS — M79.10 MYALGIA: ICD-10-CM

## 2019-08-12 DIAGNOSIS — Z79.899 HIGH RISK MEDICATION USE: Primary | ICD-10-CM

## 2019-08-12 PROCEDURE — 99212 OFFICE O/P EST SF 10 MIN: CPT | Performed by: FAMILY MEDICINE

## 2019-08-12 RX ORDER — HYDROCODONE BITARTRATE AND ACETAMINOPHEN 10; 325 MG/1; MG/1
1 TABLET ORAL EVERY 6 HOURS PRN
Qty: 100 TABLET | Refills: 0 | Status: SHIPPED | OUTPATIENT
Start: 2019-08-12 | End: 2019-09-03 | Stop reason: SDUPTHER

## 2019-08-12 RX ORDER — VENLAFAXINE 75 MG/1
75 TABLET ORAL 2 TIMES DAILY
COMMUNITY
End: 2020-02-12

## 2019-08-12 RX ORDER — LAMOTRIGINE 150 MG/1
150 TABLET ORAL DAILY
COMMUNITY
End: 2020-02-12

## 2019-08-12 NOTE — PROGRESS NOTES
MARGARITA Cruz is a 59 y.o. female who is here for follow up of fibromyalgia.  Followed closely by psychiatrist and has had medications adjusted several times.  Overall seems to be doing well on current regimen which will be continued.  Recently sent in: Sample for colon cancer screening but results are pending.    Review of Systems   Musculoskeletal: Positive for arthralgias and myalgias.   All other systems reviewed and are negative.        Past Medical History:   Diagnosis Date   • ADHD    • COPD (chronic obstructive pulmonary disease) (CMS/HCC)    • Fibromyalgia        Past Surgical History:   Procedure Laterality Date   • FOOT SURGERY         Family History   Problem Relation Age of Onset   • Lung disease Mother    • Other Other         malignant neoplasm   • Other Other         malignant neoplasm       Social History     Socioeconomic History   • Marital status: Single     Spouse name: Not on file   • Number of children: Not on file   • Years of education: Not on file   • Highest education level: Not on file   Tobacco Use   • Smoking status: Former Smoker     Packs/day: 1.00     Years: 30.00     Pack years: 30.00     Types: Cigarettes   • Smokeless tobacco: Never Used   • Tobacco comment: Pt quit smoking 5 yrs ago    Substance and Sexual Activity   • Alcohol use: Yes     Comment: OCCASIONAL   • Drug use: No     Types: Hydrocodone   • Sexual activity: No         Physical Exam   Constitutional: She is oriented to person, place, and time. She appears well-developed and well-nourished. No distress.   HENT:   Head: Normocephalic and atraumatic.   Nose: Nose normal.   Mouth/Throat: Oropharynx is clear and moist.   Eyes: Conjunctivae are normal. Pupils are equal, round, and reactive to light.   Neck: Normal range of motion. Neck supple. No tracheal deviation present.   Cardiovascular: Normal rate, regular rhythm and normal heart sounds.   Pulmonary/Chest: Effort normal. No respiratory distress.   Abdominal:  Soft. She exhibits no distension. There is no guarding.   Musculoskeletal: Normal range of motion. She exhibits no edema or deformity.   Lymphadenopathy:     She has no cervical adenopathy.   Neurological: She is alert and oriented to person, place, and time. Coordination normal.   Skin: Skin is warm and dry.   Psychiatric: She has a normal mood and affect. Her behavior is normal. Judgment and thought content normal.   Nursing note and vitals reviewed.        Assessment/Plan    Miguelina was seen today for med refill.    Diagnoses and all orders for this visit:    High risk medication use    Myalgia  -     HYDROcodone-acetaminophen (NORCO)  MG per tablet; Take 1 tablet by mouth Every 6 (Six) Hours As Needed for Moderate Pain .      Patient here mostly for close follow-up of pain medication.  Patient takes limited amounts which is the only thing which controls her pain.  Has been tried on other medications in the past.  Is followed by psychiatrist with adjustment in medications.  Overall seems to be doing very well on current regimen which will be continued with close monitoring every 3 months.  There is no evidence of abuse nor misuse of medication.    This note includes information entered using a voice recognition dictation system.  Though reviewed, some nonsensible errors may remain.

## 2019-09-03 ENCOUNTER — TELEPHONE (OUTPATIENT)
Dept: FAMILY MEDICINE CLINIC | Facility: CLINIC | Age: 59
End: 2019-09-03

## 2019-09-03 DIAGNOSIS — M79.10 MYALGIA: ICD-10-CM

## 2019-09-03 RX ORDER — HYDROCODONE BITARTRATE AND ACETAMINOPHEN 10; 325 MG/1; MG/1
1 TABLET ORAL EVERY 6 HOURS PRN
Qty: 100 TABLET | Refills: 0 | Status: SHIPPED | OUTPATIENT
Start: 2019-09-03 | End: 2019-09-19 | Stop reason: SDUPTHER

## 2019-09-03 RX ORDER — HYDROCODONE BITARTRATE AND ACETAMINOPHEN 10; 325 MG/1; MG/1
1 TABLET ORAL EVERY 6 HOURS PRN
Qty: 100 TABLET | Refills: 0 | Status: SHIPPED | OUTPATIENT
Start: 2019-09-03 | End: 2019-09-03 | Stop reason: SDUPTHER

## 2019-09-03 NOTE — TELEPHONE ENCOUNTER
NARC CONTRACT UPDATED on 12/27/2018.     Future O.V. 11/12/2019.     Last O.V. 08/12/2019.  Braxton  03/26/2019 (UPDATED in Media Tab).  Filled 08/12/2019.     Thank you.       ----- Message from Sapphire Talbert sent at 9/3/2019 10:56 AM EDT -----  HYDROcodone-acetaminophen (NORCO)  MG per tablet   Sig: Take 1 tablet by mouth Every 6 (Six) Hours As Needed for Moderate Pain .      Pharmacy:  SAMANTHA [address] AT [facility] & ([facility]) - [phone] [facility] [phone] FX

## 2019-09-19 DIAGNOSIS — M79.10 MYALGIA: ICD-10-CM

## 2019-09-19 RX ORDER — HYDROCODONE BITARTRATE AND ACETAMINOPHEN 10; 325 MG/1; MG/1
1 TABLET ORAL EVERY 6 HOURS PRN
Qty: 100 TABLET | Refills: 0 | Status: SHIPPED | OUTPATIENT
Start: 2019-09-19 | End: 2019-10-18 | Stop reason: SDUPTHER

## 2019-10-18 ENCOUNTER — TELEPHONE (OUTPATIENT)
Dept: FAMILY MEDICINE CLINIC | Facility: CLINIC | Age: 59
End: 2019-10-18

## 2019-10-18 DIAGNOSIS — M79.10 MYALGIA: ICD-10-CM

## 2019-10-18 RX ORDER — HYDROCODONE BITARTRATE AND ACETAMINOPHEN 10; 325 MG/1; MG/1
1 TABLET ORAL EVERY 6 HOURS PRN
Qty: 100 TABLET | Refills: 0 | Status: SHIPPED | OUTPATIENT
Start: 2019-10-18 | End: 2019-11-12 | Stop reason: SDUPTHER

## 2019-10-18 NOTE — TELEPHONE ENCOUNTER
NARC CONTRACT UPDATED on 12/27/2018.     Future O.V. 11/12/2019.     Last O.V. 08/12/2019.  Braxton  09/01/2019.  Filled 09/19/2019.     Thank you.       ----- Message from Sapphire Talbert sent at 10/18/2019 10:31 AM EDT -----  HYDROcodone-acetaminophen (NORCO)  MG per tablet   Sig: Take 1 tablet by mouth Every 6 (Six) Hours As Needed for Moderate Pain .    Pharmacy:  SAMANTHA TINOCO[address] AT [facility] & ([facility]) - [phone] [facility] [phone] FX

## 2019-11-12 ENCOUNTER — OFFICE VISIT (OUTPATIENT)
Dept: FAMILY MEDICINE CLINIC | Facility: CLINIC | Age: 59
End: 2019-11-12

## 2019-11-12 ENCOUNTER — RESULTS ENCOUNTER (OUTPATIENT)
Dept: FAMILY MEDICINE CLINIC | Facility: CLINIC | Age: 59
End: 2019-11-12

## 2019-11-12 VITALS
TEMPERATURE: 97.8 F | WEIGHT: 158.6 LBS | BODY MASS INDEX: 26.42 KG/M2 | OXYGEN SATURATION: 95 % | RESPIRATION RATE: 16 BRPM | SYSTOLIC BLOOD PRESSURE: 114 MMHG | DIASTOLIC BLOOD PRESSURE: 74 MMHG | HEART RATE: 99 BPM | HEIGHT: 65 IN

## 2019-11-12 DIAGNOSIS — Z12.11 SCREENING FOR COLORECTAL CANCER: ICD-10-CM

## 2019-11-12 DIAGNOSIS — M79.10 MYALGIA: ICD-10-CM

## 2019-11-12 DIAGNOSIS — Z23 IMMUNIZATION DUE: ICD-10-CM

## 2019-11-12 DIAGNOSIS — Z12.12 SCREENING FOR COLORECTAL CANCER: ICD-10-CM

## 2019-11-12 DIAGNOSIS — J44.9 CHRONIC OBSTRUCTIVE PULMONARY DISEASE, UNSPECIFIED COPD TYPE (HCC): ICD-10-CM

## 2019-11-12 DIAGNOSIS — M21.612 BUNION OF GREAT TOE OF LEFT FOOT: Primary | ICD-10-CM

## 2019-11-12 DIAGNOSIS — Z12.31 BREAST CANCER SCREENING BY MAMMOGRAM: ICD-10-CM

## 2019-11-12 PROCEDURE — 99214 OFFICE O/P EST MOD 30 MIN: CPT | Performed by: FAMILY MEDICINE

## 2019-11-12 RX ORDER — IBUPROFEN 400 MG/1
400 TABLET ORAL EVERY 4 HOURS PRN
Qty: 50 TABLET | Refills: 5 | Status: SHIPPED | OUTPATIENT
Start: 2019-11-12 | End: 2020-10-05

## 2019-11-12 RX ORDER — HYDROCODONE BITARTRATE AND ACETAMINOPHEN 10; 325 MG/1; MG/1
1 TABLET ORAL EVERY 6 HOURS PRN
Qty: 100 TABLET | Refills: 0 | Status: SHIPPED | OUTPATIENT
Start: 2019-11-12 | End: 2019-12-04 | Stop reason: SDUPTHER

## 2019-11-12 NOTE — PROGRESS NOTES
HPI  Miguelina Cruz is a 59 y.o. female who is here for follow up of COPD and fibromyalgia.  Patient main complaint is a painful left foot and was seen at immediate care center because of acute pain and swelling.  Requested x-ray but after waiting 3 hours for x-ray left.  Was seen by podiatrist in the past who recommended surgical intervention but patient says unable to take off because having to care for her mother etc.  Currently using Tylenol for pain and swelling and occasional pain medication as needed.  Discussed bunion is basically a type of osteoarthritis and recommended trying ibuprofen instead of Tylenol and will send prescription as discussed.  Also discussed mammography.  Reports sent in sample for Cologuard last summer and as usual able to find orders but no results.  Will reorder?      Review of Systems   Respiratory: Positive for shortness of breath.         Reports shortness of air better with Anoro but unfortunately was no longer covered by insurance?   Musculoskeletal: Positive for arthralgias, gait problem, joint swelling and myalgias.   All other systems reviewed and are negative.        Past Medical History:   Diagnosis Date   • ADHD    • COPD (chronic obstructive pulmonary disease) (CMS/[facility])    • Fibromyalgia        Past Surgical History:   Procedure Laterality Date   • FOOT SURGERY         Family History   Problem Relation Age of Onset   • Lung disease Mother    • Other Other         malignant neoplasm   • Other Other         malignant neoplasm       Social History     Socioeconomic History   • Marital status: Single     Spouse name: Not on file   • Number of children: Not on file   • Years of education: Not on file   • Highest education level: Not on file   Tobacco Use   • Smoking status: Former Smoker     Packs/day: 1.00     Years: 30.00     Pack years: 30.00     Types: Cigarettes   • Smokeless tobacco: Never Used   • Tobacco comment: Pt quit smoking 5 yrs ago    Substance and Sexual  Activity   • Alcohol use: Yes     Comment: OCCASIONAL   • Drug use: No     Types: Hydrocodone   • Sexual activity: No         Physical Exam   Constitutional: She is oriented to person, place, and time. She appears well-developed and well-nourished. No distress.   HENT:   Head: Normocephalic and atraumatic.   Nose: Nose normal.   Mouth/Throat: Oropharynx is clear and moist. No oropharyngeal exudate.   Eyes: Conjunctivae and EOM are normal. Pupils are equal, round, and reactive to light.   Neck: Normal range of motion.   Cardiovascular: Normal rate, regular rhythm and normal heart sounds.   Pulmonary/Chest: Effort normal. No respiratory distress. She has no wheezes. She has no rales.   Abdominal: Soft. There is no tenderness.   Musculoskeletal: Normal range of motion. She exhibits deformity. She exhibits no edema.   Lymphadenopathy:     She has no cervical adenopathy.   Neurological: She is alert and oriented to person, place, and time. She exhibits normal muscle tone. Coordination normal.   Skin: Skin is warm and dry.   Psychiatric: She has a normal mood and affect. Her behavior is normal. Judgment and thought content normal.   Nursing note and vitals reviewed.        Assessment/Plan    Miguelina was seen today for copd, add and flu vaccine.    Diagnoses and all orders for this visit:    Bunion of great toe of left foot  -     Ambulatory Referral to Podiatry    Myalgia  -     HYDROcodone-acetaminophen (NORCO)  MG per tablet; Take 1 tablet by mouth Every 6 (Six) Hours As Needed for Moderate Pain .    Chronic obstructive pulmonary disease, unspecified COPD type (CMS/HCC)    Breast cancer screening by mammogram  -     Mammo Screening Bilateral With CAD; Future    Screening for colorectal cancer  -     Cologuard - Stool, Per Rectum; Future    Other orders  -     ibuprofen (ADVIL,MOTRIN) 400 MG tablet; Take 1 tablet by mouth Every 4 (Four) Hours As Needed for Mild Pain  or Moderate Pain .      Patient here for follow-up  of fibromyalgia on chronic pain medication.  Remains very functional and continues to take care of her mother etc.  Recent episode of pain and swelling of the left foot and exam does show significant bunion of the first MP joint.  Previously seen by podiatrist who recommended surgery and this was again discussed.  Also discussed using ibuprofen instead of Tylenol to see if works better especially for pain and swelling.  Also discussed routine health maintenance including mammography which will be ordered.  Had previously ordered Cologuard but unable to find results?  Will reorder and hopefully insurance coverage will be verified?  Otherwise continue current therapy including close monitoring of controlled medications every 3 months.  If continues on ibuprofen will recheck CBC and renal function next appointment.  There is no evidence of abuse nor misuse of currently prescribed medications.    This note includes information entered using a voice recognition dictation system.  Though reviewed, some nonsensible errors may remain.

## 2019-11-19 ENCOUNTER — TRANSCRIBE ORDERS (OUTPATIENT)
Dept: ADMINISTRATIVE | Facility: HOSPITAL | Age: 59
End: 2019-11-19

## 2019-11-19 DIAGNOSIS — Z12.31 VISIT FOR SCREENING MAMMOGRAM: Primary | ICD-10-CM

## 2019-12-04 ENCOUNTER — TELEPHONE (OUTPATIENT)
Dept: FAMILY MEDICINE CLINIC | Facility: CLINIC | Age: 59
End: 2019-12-04

## 2019-12-04 DIAGNOSIS — M79.10 MYALGIA: ICD-10-CM

## 2019-12-04 RX ORDER — HYDROCODONE BITARTRATE AND ACETAMINOPHEN 10; 325 MG/1; MG/1
1 TABLET ORAL EVERY 6 HOURS PRN
Qty: 100 TABLET | Refills: 0 | Status: SHIPPED | OUTPATIENT
Start: 2019-12-04 | End: 2019-12-27 | Stop reason: SDUPTHER

## 2019-12-04 NOTE — TELEPHONE ENCOUNTER
norco  mg take one tab every 6 hours as needed for moderate pain #100 last rx 11/12/19        Last ov 11/12/19  william 9/2019    [facility] [phone]

## 2019-12-17 DIAGNOSIS — J44.9 CHRONIC OBSTRUCTIVE PULMONARY DISEASE, UNSPECIFIED COPD TYPE (HCC): ICD-10-CM

## 2019-12-17 RX ORDER — ALBUTEROL SULFATE 90 UG/1
AEROSOL, METERED RESPIRATORY (INHALATION)
Qty: 18 G | Refills: 2 | Status: SHIPPED | OUTPATIENT
Start: 2019-12-17 | End: 2020-05-12 | Stop reason: SDUPTHER

## 2019-12-26 ENCOUNTER — TELEPHONE (OUTPATIENT)
Dept: FAMILY MEDICINE CLINIC | Facility: CLINIC | Age: 59
End: 2019-12-26

## 2019-12-26 NOTE — TELEPHONE ENCOUNTER
HYDROcodone-acetaminophen (NORCO)  MG per tablet   Sig: Take 1 tablet by mouth Every 6 (Six) Hours As Needed for Moderate Pain .     Pharmacy:  [facility]AN[facility]A COELHO[address] AT [facility] & ([facility]) - [phone] [facility] [phone] FX

## 2019-12-27 DIAGNOSIS — M79.10 MYALGIA: ICD-10-CM

## 2019-12-27 RX ORDER — HYDROCODONE BITARTRATE AND ACETAMINOPHEN 10; 325 MG/1; MG/1
1 TABLET ORAL EVERY 6 HOURS PRN
Qty: 100 TABLET | Refills: 0 | Status: SHIPPED | OUTPATIENT
Start: 2019-12-27 | End: 2020-01-17 | Stop reason: SDUPTHER

## 2020-01-15 ENCOUNTER — APPOINTMENT (OUTPATIENT)
Dept: MAMMOGRAPHY | Facility: HOSPITAL | Age: 60
End: 2020-01-15

## 2020-01-17 DIAGNOSIS — M79.10 MYALGIA: ICD-10-CM

## 2020-01-17 RX ORDER — HYDROCODONE BITARTRATE AND ACETAMINOPHEN 10; 325 MG/1; MG/1
1 TABLET ORAL EVERY 6 HOURS PRN
Qty: 100 TABLET | Refills: 0 | Status: SHIPPED | OUTPATIENT
Start: 2020-01-17 | End: 2020-02-10 | Stop reason: SDUPTHER

## 2020-02-10 ENCOUNTER — TELEPHONE (OUTPATIENT)
Dept: FAMILY MEDICINE CLINIC | Facility: CLINIC | Age: 60
End: 2020-02-10

## 2020-02-10 DIAGNOSIS — M79.10 MYALGIA: ICD-10-CM

## 2020-02-10 RX ORDER — HYDROCODONE BITARTRATE AND ACETAMINOPHEN 10; 325 MG/1; MG/1
1 TABLET ORAL EVERY 6 HOURS PRN
Qty: 100 TABLET | Refills: 0 | Status: SHIPPED | OUTPATIENT
Start: 2020-02-10 | End: 2020-03-04 | Stop reason: SDUPTHER

## 2020-02-10 NOTE — TELEPHONE ENCOUNTER
HYDROcodone-acetaminophen (NORCO)  MG per tablet   Sig: Take 1 tablet by mouth Every 6 (Six) Hours As Needed for Moderate Pain .     Pharmacy:  [facility]ANTHA COELHO[address] AT [facility] & ([facility]) - [phone] [facility] [phone] FX

## 2020-02-12 ENCOUNTER — OFFICE VISIT (OUTPATIENT)
Dept: FAMILY MEDICINE CLINIC | Facility: CLINIC | Age: 60
End: 2020-02-12

## 2020-02-12 VITALS
SYSTOLIC BLOOD PRESSURE: 140 MMHG | HEART RATE: 88 BPM | BODY MASS INDEX: 26.99 KG/M2 | TEMPERATURE: 98.5 F | WEIGHT: 162 LBS | HEIGHT: 65 IN | RESPIRATION RATE: 15 BRPM | DIASTOLIC BLOOD PRESSURE: 90 MMHG | OXYGEN SATURATION: 94 %

## 2020-02-12 DIAGNOSIS — Z20.828 EXPOSURE TO INFLUENZA: ICD-10-CM

## 2020-02-12 DIAGNOSIS — Z87.891 HISTORY OF CIGARETTE SMOKING: ICD-10-CM

## 2020-02-12 DIAGNOSIS — F98.8 ATTENTION DEFICIT DISORDER, UNSPECIFIED HYPERACTIVITY PRESENCE: ICD-10-CM

## 2020-02-12 DIAGNOSIS — Z01.419 ENCOUNTER FOR ROUTINE GYNECOLOGICAL EXAMINATION WITH PAPANICOLAOU SMEAR OF CERVIX: ICD-10-CM

## 2020-02-12 DIAGNOSIS — M79.10 MYALGIA: ICD-10-CM

## 2020-02-12 DIAGNOSIS — J44.9 CHRONIC OBSTRUCTIVE PULMONARY DISEASE, UNSPECIFIED COPD TYPE (HCC): Primary | ICD-10-CM

## 2020-02-12 DIAGNOSIS — Z79.899 HIGH RISK MEDICATION USE: ICD-10-CM

## 2020-02-12 PROCEDURE — 99214 OFFICE O/P EST MOD 30 MIN: CPT | Performed by: FAMILY MEDICINE

## 2020-02-12 RX ORDER — BUPROPION HYDROCHLORIDE 75 MG/1
TABLET ORAL
COMMUNITY
Start: 2020-02-07 | End: 2020-08-03

## 2020-02-12 NOTE — PROGRESS NOTES
HPI  Miguelina Cruz is a 59 y.o. female who is here for follow up of chronic pain medication for fibromyalgia.  Patient recently exposed to 18-month-old who apparently exposed to influenza.  Patient does report getting her flu shot earlier.  Does report some worsening shortness of air possibly related to age weight gain.  Remains off cigarettes.  Will be due for annual chest CT in May.  Told to call office if flu symptoms develop and will consider starting Tamiflu.  Reviewed previous pulmonary function tests and remain on current inhalers.  Apparently Cologuard screening was not completed and needs to contact them to get a new box?  Also past due for Pap smear and pelvic exam and discussed options with patient, prefers GYN referral.  Also discussed previous lipid panel and has high HDL, do not feel repeat necessary at this time.      Review of Systems   Constitutional: Negative for chills and fever.   Respiratory: Positive for shortness of breath. Negative for cough.    Musculoskeletal: Positive for myalgias.   All other systems reviewed and are negative.        Past Medical History:   Diagnosis Date   • ADHD    • COPD (chronic obstructive pulmonary disease) (CMS/HCC)    • Fibromyalgia        Past Surgical History:   Procedure Laterality Date   • FOOT SURGERY         Family History   Problem Relation Age of Onset   • Lung disease Mother    • Other Other         malignant neoplasm   • Other Other         malignant neoplasm       Social History     Socioeconomic History   • Marital status: Single     Spouse name: Not on file   • Number of children: Not on file   • Years of education: Not on file   • Highest education level: Not on file   Tobacco Use   • Smoking status: Former Smoker     Packs/day: 1.00     Years: 30.00     Pack years: 30.00     Types: Cigarettes   • Smokeless tobacco: Never Used   • Tobacco comment: Pt quit smoking 5 yrs ago    Substance and Sexual Activity   • Alcohol use: Yes     Comment: OCCASIONAL    • Drug use: No     Types: Hydrocodone   • Sexual activity: Never         Physical Exam   Constitutional: She is oriented to person, place, and time. She appears well-developed and well-nourished.   HENT:   Head: Normocephalic and atraumatic.   Nose: Nose normal.   Mouth/Throat: Oropharynx is clear and moist. No oropharyngeal exudate.   Eyes: Pupils are equal, round, and reactive to light. Conjunctivae and EOM are normal.   Neck: Normal range of motion. Neck supple. No thyromegaly present.   Cardiovascular: Normal rate, regular rhythm and normal heart sounds.   Pulmonary/Chest: Effort normal and breath sounds normal. She has no wheezes. She has no rales.   Abdominal: Soft. She exhibits no distension. There is no tenderness.   Musculoskeletal: Normal range of motion. She exhibits no edema or deformity.   Lymphadenopathy:     She has no cervical adenopathy.   Neurological: She is oriented to person, place, and time. She exhibits normal muscle tone. Coordination normal.   Skin: Skin is warm and dry.   Psychiatric: She has a normal mood and affect. Her behavior is normal. Judgment and thought content normal.   Nursing note and vitals reviewed.        Assessment/Plan    Miguelina was seen today for copd.    Diagnoses and all orders for this visit:    Chronic obstructive pulmonary disease, unspecified COPD type (CMS/HCC)    Attention deficit disorder, unspecified hyperactivity presence    High risk medication use  -     CBC & Differential  -     Comprehensive Metabolic Panel    Encounter for routine gynecological examination with Papanicolaou smear of cervix  -     Ambulatory Referral to Gynecology    History of cigarette smoking    Myalgia      Patient here for routine follow-up of above-noted medical problems.  Remains on controlled medication both for attention deficit disorder and chronic muscle pain from fibromyalgia.  Remains functional and doing extremely well on current regimen which will be continued with close  monitoring.  There is no evidence of abuse nor misuse of medication.  Needs to resubmit colon cancer screening as discussed and recommend contacting Cologuard.  Also past due for pelvic exam and Pap smear and will refer as discussed above.  Follow-up in 3 months at which time will also reorder annual chest CT.    This note includes information entered using a voice recognition dictation system.  Though reviewed, some nonsensible errors may remain.

## 2020-02-13 LAB
ALBUMIN SERPL-MCNC: 4.6 G/DL (ref 3.5–5.2)
ALBUMIN/GLOB SERPL: 1.7 G/DL
ALP SERPL-CCNC: 65 U/L (ref 39–117)
ALT SERPL-CCNC: 20 U/L (ref 1–33)
AST SERPL-CCNC: 30 U/L (ref 1–32)
BASOPHILS # BLD AUTO: 0.04 10*3/MM3 (ref 0–0.2)
BASOPHILS NFR BLD AUTO: 0.4 % (ref 0–1.5)
BILIRUB SERPL-MCNC: 0.4 MG/DL (ref 0.2–1.2)
BUN SERPL-MCNC: 13 MG/DL (ref 6–20)
BUN/CREAT SERPL: 15.3 (ref 7–25)
CALCIUM SERPL-MCNC: 9.5 MG/DL (ref 8.6–10.5)
CHLORIDE SERPL-SCNC: 99 MMOL/L (ref 98–107)
CO2 SERPL-SCNC: 24.4 MMOL/L (ref 22–29)
CREAT SERPL-MCNC: 0.85 MG/DL (ref 0.57–1)
EOSINOPHIL # BLD AUTO: 0.15 10*3/MM3 (ref 0–0.4)
EOSINOPHIL NFR BLD AUTO: 1.5 % (ref 0.3–6.2)
ERYTHROCYTE [DISTWIDTH] IN BLOOD BY AUTOMATED COUNT: 12.2 % (ref 12.3–15.4)
GLOBULIN SER CALC-MCNC: 2.7 GM/DL
GLUCOSE SERPL-MCNC: 88 MG/DL (ref 65–99)
HCT VFR BLD AUTO: 41 % (ref 34–46.6)
HGB BLD-MCNC: 14.1 G/DL (ref 12–15.9)
IMM GRANULOCYTES # BLD AUTO: 0.02 10*3/MM3 (ref 0–0.05)
IMM GRANULOCYTES NFR BLD AUTO: 0.2 % (ref 0–0.5)
LYMPHOCYTES # BLD AUTO: 1.57 10*3/MM3 (ref 0.7–3.1)
LYMPHOCYTES NFR BLD AUTO: 15.3 % (ref 19.6–45.3)
MCH RBC QN AUTO: 33 PG (ref 26.6–33)
MCHC RBC AUTO-ENTMCNC: 34.4 G/DL (ref 31.5–35.7)
MCV RBC AUTO: 96 FL (ref 79–97)
MONOCYTES # BLD AUTO: 0.46 10*3/MM3 (ref 0.1–0.9)
MONOCYTES NFR BLD AUTO: 4.5 % (ref 5–12)
NEUTROPHILS # BLD AUTO: 8 10*3/MM3 (ref 1.7–7)
NEUTROPHILS NFR BLD AUTO: 78.1 % (ref 42.7–76)
NRBC BLD AUTO-RTO: 0 /100 WBC (ref 0–0.2)
PLATELET # BLD AUTO: 337 10*3/MM3 (ref 140–450)
POTASSIUM SERPL-SCNC: 5 MMOL/L (ref 3.5–5.2)
PROT SERPL-MCNC: 7.3 G/DL (ref 6–8.5)
RBC # BLD AUTO: 4.27 10*6/MM3 (ref 3.77–5.28)
SODIUM SERPL-SCNC: 139 MMOL/L (ref 136–145)
WBC # BLD AUTO: 10.24 10*3/MM3 (ref 3.4–10.8)

## 2020-03-04 DIAGNOSIS — M79.10 MYALGIA: ICD-10-CM

## 2020-03-04 RX ORDER — HYDROCODONE BITARTRATE AND ACETAMINOPHEN 10; 325 MG/1; MG/1
1 TABLET ORAL EVERY 6 HOURS PRN
Qty: 100 TABLET | Refills: 0 | Status: SHIPPED | OUTPATIENT
Start: 2020-03-04 | End: 2020-03-27 | Stop reason: SDUPTHER

## 2020-03-04 NOTE — TELEPHONE ENCOUNTER
Pt is asking for a refill on Hydrocodone she says she takes 4 times a day.  Her last Braxton was 2/20/20.  Last office visit 2/12/20.  Last fill 2/10/20 # 100.

## 2020-03-27 DIAGNOSIS — M79.10 MYALGIA: ICD-10-CM

## 2020-03-27 RX ORDER — HYDROCODONE BITARTRATE AND ACETAMINOPHEN 10; 325 MG/1; MG/1
1 TABLET ORAL EVERY 6 HOURS PRN
Qty: 100 TABLET | Refills: 0 | Status: SHIPPED | OUTPATIENT
Start: 2020-03-27 | End: 2020-04-17 | Stop reason: SDUPTHER

## 2020-03-27 NOTE — TELEPHONE ENCOUNTER
PATIENT NEEDS A REFILL ON NORCO      LAST KWADWO 2/20/2020    LAST SEEN 2/12/2020    LAST FILL 3/4/2020    PHARMACY IS CORRECT

## 2020-04-17 DIAGNOSIS — M79.10 MYALGIA: ICD-10-CM

## 2020-04-17 RX ORDER — HYDROCODONE BITARTRATE AND ACETAMINOPHEN 10; 325 MG/1; MG/1
1 TABLET ORAL EVERY 6 HOURS PRN
Qty: 100 TABLET | Refills: 0 | Status: SHIPPED | OUTPATIENT
Start: 2020-04-17 | End: 2020-05-12 | Stop reason: SDUPTHER

## 2020-04-17 NOTE — TELEPHONE ENCOUNTER
PT CALLED TO GET A REFILL OF HYDROcodone-acetaminophen (Norco)  MG per tablet. PT HAS 3 DAYS ON HER CURRENT RX.    [facility] [address] [place]

## 2020-04-24 DIAGNOSIS — J44.9 CHRONIC OBSTRUCTIVE PULMONARY DISEASE, UNSPECIFIED COPD TYPE (HCC): ICD-10-CM

## 2020-04-27 RX ORDER — TIOTROPIUM BROMIDE AND OLODATEROL 3.124; 2.736 UG/1; UG/1
SPRAY, METERED RESPIRATORY (INHALATION)
Qty: 4 INHALER | Refills: 3 | Status: SHIPPED | OUTPATIENT
Start: 2020-04-27 | End: 2020-10-05

## 2020-05-08 ENCOUNTER — TELEMEDICINE (OUTPATIENT)
Dept: FAMILY MEDICINE CLINIC | Facility: CLINIC | Age: 60
End: 2020-05-08

## 2020-05-08 DIAGNOSIS — Z12.2 ENCOUNTER FOR SCREENING FOR LUNG CANCER: ICD-10-CM

## 2020-05-08 DIAGNOSIS — Z87.891 HISTORY OF CIGARETTE SMOKING: ICD-10-CM

## 2020-05-08 DIAGNOSIS — Z12.31 BREAST CANCER SCREENING BY MAMMOGRAM: ICD-10-CM

## 2020-05-08 DIAGNOSIS — J44.9 CHRONIC OBSTRUCTIVE PULMONARY DISEASE, UNSPECIFIED COPD TYPE (HCC): ICD-10-CM

## 2020-05-08 DIAGNOSIS — Z79.899 HIGH RISK MEDICATION USE: ICD-10-CM

## 2020-05-08 DIAGNOSIS — Z01.419 WELL FEMALE EXAM WITH ROUTINE GYNECOLOGICAL EXAM: ICD-10-CM

## 2020-05-08 DIAGNOSIS — M79.10 MYALGIA: Primary | ICD-10-CM

## 2020-05-08 PROCEDURE — 99214 OFFICE O/P EST MOD 30 MIN: CPT | Performed by: FAMILY MEDICINE

## 2020-05-08 NOTE — PROGRESS NOTES
HPI  Miguelnia Cruz is a 59 y.o. female who is here for follow up especially of pain medication for chronic myalgia.  Has been staying home and doing yoga and feeling better especially breathing.  Because of pandemic did not get mammogram and also apparently never contacted for Pap smear and routine pelvic exam.  Will reschedule.  Also is almost due for annual chest CT for smoking history.  All of this was discussed.  Otherwise patient denied any new complaints.      Review of Systems   Musculoskeletal: Positive for myalgias.   All other systems reviewed and are negative.        Past Medical History:   Diagnosis Date   • ADHD    • COPD (chronic obstructive pulmonary disease) (CMS/HCC)    • Fibromyalgia        Past Surgical History:   Procedure Laterality Date   • FOOT SURGERY         Family History   Problem Relation Age of Onset   • Lung disease Mother    • Other Other         malignant neoplasm   • Other Other         malignant neoplasm       Social History     Socioeconomic History   • Marital status: Single     Spouse name: Not on file   • Number of children: Not on file   • Years of education: Not on file   • Highest education level: Not on file   Tobacco Use   • Smoking status: Former Smoker     Packs/day: 1.00     Years: 30.00     Pack years: 30.00     Types: Cigarettes   • Smokeless tobacco: Never Used   • Tobacco comment: Pt quit smoking 5 yrs ago    Substance and Sexual Activity   • Alcohol use: Yes     Comment: OCCASIONAL   • Drug use: No     Types: Hydrocodone   • Sexual activity: Never         Physical Exam   Constitutional: She is oriented to person, place, and time. She appears well-developed and well-nourished. No distress.   HENT:   Head: Normocephalic and atraumatic.   Eyes: Pupils are equal, round, and reactive to light. Conjunctivae and EOM are normal.   Pulmonary/Chest: Effort normal. No respiratory distress.   Neurological: She is alert and oriented to person, place, and time.   Psychiatric:  She has a normal mood and affect. Her behavior is normal. Judgment and thought content normal.   Vitals reviewed.        Assessment/Plan    Diagnoses and all orders for this visit:    Myalgia    High risk medication use    Breast cancer screening by mammogram  -     Mammo Screening Bilateral With CAD; Future    Well female exam with routine gynecological exam  -     Ambulatory Referral to Gynecology    History of cigarette smoking  -     CT Chest Low Dose Wo; Future    Chronic obstructive pulmonary disease, unspecified COPD type (CMS/HCC)    Encounter for screening for lung cancer  -     CT Chest Low Dose Wo; Future      Follow-up visit for above-noted medical problems.  Continues pain medication for muscle pains.  Currently doing yoga at home and continues to care for her mother.  Routine health screening measures noted and apparently many of them were delayed because of pandemic.  Will reschedule.  Will be due for annual chest CT in a couple of weeks.  Otherwise seems to be doing very well on current regimen which will be continued including close monitoring especially of pain medication every 3 months.  There is no evidence of abuse nor misuse of controlled medications.  Remains off cigarettes which is strongly encouraged.    This note includes information entered using a voice recognition dictation system.  Though reviewed, some nonsensible errors may remain.    This was an audio and video enabled telemedicine encounter.

## 2020-05-12 DIAGNOSIS — J44.9 CHRONIC OBSTRUCTIVE PULMONARY DISEASE, UNSPECIFIED COPD TYPE (HCC): ICD-10-CM

## 2020-05-12 DIAGNOSIS — M79.10 MYALGIA: ICD-10-CM

## 2020-05-12 RX ORDER — ALBUTEROL SULFATE 90 UG/1
2 AEROSOL, METERED RESPIRATORY (INHALATION) EVERY 4 HOURS PRN
Qty: 18 G | Refills: 2 | Status: SHIPPED | OUTPATIENT
Start: 2020-05-12 | End: 2020-12-10

## 2020-05-12 RX ORDER — HYDROCODONE BITARTRATE AND ACETAMINOPHEN 10; 325 MG/1; MG/1
1 TABLET ORAL EVERY 6 HOURS PRN
Qty: 100 TABLET | Refills: 0 | Status: SHIPPED | OUTPATIENT
Start: 2020-05-12 | End: 2020-06-04 | Stop reason: SDUPTHER

## 2020-06-04 DIAGNOSIS — M79.10 MYALGIA: ICD-10-CM

## 2020-06-05 ENCOUNTER — TELEPHONE (OUTPATIENT)
Dept: FAMILY MEDICINE CLINIC | Facility: CLINIC | Age: 60
End: 2020-06-05

## 2020-06-05 RX ORDER — HYDROCODONE BITARTRATE AND ACETAMINOPHEN 10; 325 MG/1; MG/1
1 TABLET ORAL EVERY 6 HOURS PRN
Qty: 100 TABLET | Refills: 0 | Status: SHIPPED | OUTPATIENT
Start: 2020-06-05 | End: 2020-06-25 | Stop reason: SDUPTHER

## 2020-06-05 NOTE — TELEPHONE ENCOUNTER
Patient is calling to check the status of a request for a refill of Hydrocodone on 06/04.    Please advise    Patient call back [phone]    [facility] [address] [facility] confirmed

## 2020-06-25 DIAGNOSIS — M79.10 MYALGIA: ICD-10-CM

## 2020-06-25 RX ORDER — HYDROCODONE BITARTRATE AND ACETAMINOPHEN 10; 325 MG/1; MG/1
1 TABLET ORAL EVERY 6 HOURS PRN
Qty: 100 TABLET | Refills: 0 | Status: SHIPPED | OUTPATIENT
Start: 2020-06-25 | End: 2020-07-20 | Stop reason: SDUPTHER

## 2020-07-02 ENCOUNTER — HOSPITAL ENCOUNTER (OUTPATIENT)
Dept: CT IMAGING | Facility: HOSPITAL | Age: 60
Discharge: HOME | End: 2020-07-02
Admitting: FAMILY MEDICINE

## 2020-07-02 ENCOUNTER — HOSPITAL ENCOUNTER (OUTPATIENT)
Dept: MAMMOGRAPHY | Facility: HOSPITAL | Age: 60
Discharge: HOME | End: 2020-07-02

## 2020-07-02 DIAGNOSIS — Z12.31 BREAST CANCER SCREENING BY MAMMOGRAM: ICD-10-CM

## 2020-07-02 DIAGNOSIS — Z12.2 ENCOUNTER FOR SCREENING FOR LUNG CANCER: ICD-10-CM

## 2020-07-02 DIAGNOSIS — Z87.891 HISTORY OF CIGARETTE SMOKING: ICD-10-CM

## 2020-07-02 PROCEDURE — G0297 LDCT FOR LUNG CA SCREEN: HCPCS

## 2020-07-02 PROCEDURE — 77067 SCR MAMMO BI INCL CAD: CPT

## 2020-07-02 PROCEDURE — 77063 BREAST TOMOSYNTHESIS BI: CPT

## 2020-07-16 ENCOUNTER — OFFICE VISIT (OUTPATIENT)
Dept: OBSTETRICS AND GYNECOLOGY | Facility: CLINIC | Age: 60
End: 2020-07-16

## 2020-07-16 VITALS
SYSTOLIC BLOOD PRESSURE: 120 MMHG | WEIGHT: 166.8 LBS | BODY MASS INDEX: 27.79 KG/M2 | HEIGHT: 65 IN | DIASTOLIC BLOOD PRESSURE: 72 MMHG

## 2020-07-16 DIAGNOSIS — Z00.00 ANNUAL PHYSICAL EXAM: Primary | ICD-10-CM

## 2020-07-16 PROCEDURE — 99386 PREV VISIT NEW AGE 40-64: CPT | Performed by: OBSTETRICS & GYNECOLOGY

## 2020-07-16 NOTE — PROGRESS NOTES
MARGARITA Cruz  is a 60 y.o. female who presents to [facility] care and have a routine gynecologic exam.  Overall, she is feeling well.  Bowels and bladder are functioning normally.  The patient does not experience hot flashes or mood swings.  She has been menopausal for many years.  Mammogram was performed in May.  It was negative.  The patient reports a recent Cologuard.  Results were not available for my review.    Chief Complaint   Patient presents with   • New Gyn     Patient is here for a new gyn annual.       Past Medical History:   Diagnosis Date   • ADHD    • COPD (chronic obstructive pulmonary disease) (CMS/[facility])    • Fibromyalgia        Past Surgical History:   Procedure Laterality Date   • FOOT SURGERY         Social History     Socioeconomic History   • Marital status: Single     Spouse name: Not on file   • Number of children: Not on file   • Years of education: Not on file   • Highest education level: Not on file   Tobacco Use   • Smoking status: Former Smoker     Packs/day: 1.00     Years: 30.00     Pack years: 30.00     Types: Cigarettes   • Smokeless tobacco: Never Used   • Tobacco comment: Pt quit smoking 5 yrs ago    Substance and Sexual Activity   • Alcohol use: Yes     Comment: OCCASIONAL   • Drug use: No     Types: Hydrocodone   • Sexual activity: Never       The following portions of the patient's history were reviewed and updated as appropriate: allergies, current medications, past family history, past medical history, past social history, past surgical history and problem list.    Review of Systems   Constitutional: Negative.    HENT: Negative.    Respiratory: Negative.    Cardiovascular: Negative.    Gastrointestinal: Negative.    Genitourinary: Negative.    Musculoskeletal: Negative.    Skin: Negative.    Allergic/Immunologic: Negative.    Psychiatric/Behavioral: Negative.              Physical Exam   Constitutional: She is oriented to person, place, and time. She appears  well-developed and well-nourished.   HENT:   Head: Normocephalic and atraumatic.   Cardiovascular: Normal rate and regular rhythm.   Pulmonary/Chest: Effort normal and breath sounds normal. She has no wheezes. She has no rales.   The breasts are homogeneous.  There are no palpable lumps.  Nipple discharge and axillary adenopathy are absent.   Abdominal: Soft. She exhibits no distension. There is no tenderness.   Genitourinary: Vagina normal and uterus normal. There is no lesion on the right labia. There is no lesion on the left labia. Cervix exhibits no motion tenderness. Right adnexum displays no mass and no tenderness. Left adnexum displays no mass and no tenderness. No vaginal discharge found.   Neurological: She is alert and oriented to person, place, and time.   Skin: Skin is warm and dry.   Nursing note and vitals reviewed.      Assessment    Miguelina was seen today for new gyn.    Diagnoses and all orders for this visit:    Annual physical exam        Plan  1. Annual examination and Pap smear performed  2. Counseled regarding the importance of regular screening mammograms.  Mammogram was negative this year  3. Counseled regarding the importance of colon cancer screening.  The patient reports a recent Cologuard.  Results are not yet available for my review.  The patient plans to follow-up with her primary care physician regarding this.    4. Return in about 1 year (around 7/16/2021).    Social History     Tobacco Use   Smoking Status Former Smoker   • Packs/day: 1.00   • Years: 30.00   • Pack years: 30.00   • Types: Cigarettes   Tobacco Comment    Pt quit smoking 5 yrs ago    5.

## 2020-07-20 DIAGNOSIS — M79.10 MYALGIA: ICD-10-CM

## 2020-07-20 RX ORDER — HYDROCODONE BITARTRATE AND ACETAMINOPHEN 10; 325 MG/1; MG/1
1 TABLET ORAL EVERY 6 HOURS PRN
Qty: 100 TABLET | Refills: 0 | Status: CANCELLED | OUTPATIENT
Start: 2020-07-20

## 2020-07-20 RX ORDER — HYDROCODONE BITARTRATE AND ACETAMINOPHEN 10; 325 MG/1; MG/1
1 TABLET ORAL EVERY 6 HOURS PRN
Qty: 100 TABLET | Refills: 0 | Status: SHIPPED | OUTPATIENT
Start: 2020-07-20 | End: 2020-08-12 | Stop reason: SDUPTHER

## 2020-07-21 LAB
CONV .: NORMAL
CYTOLOGIST CVX/VAG CYTO: NORMAL
CYTOLOGY CVX/VAG DOC CYTO: NORMAL
CYTOLOGY CVX/VAG DOC THIN PREP: NORMAL
DX ICD CODE: NORMAL
HIV 1 & 2 AB SER-IMP: NORMAL
OTHER STN SPEC: NORMAL
STAT OF ADQ CVX/VAG CYTO-IMP: NORMAL

## 2020-08-03 ENCOUNTER — OFFICE VISIT (OUTPATIENT)
Dept: FAMILY MEDICINE CLINIC | Facility: CLINIC | Age: 60
End: 2020-08-03

## 2020-08-03 VITALS
SYSTOLIC BLOOD PRESSURE: 140 MMHG | TEMPERATURE: 96.5 F | RESPIRATION RATE: 16 BRPM | DIASTOLIC BLOOD PRESSURE: 80 MMHG | HEIGHT: 65 IN | BODY MASS INDEX: 27.49 KG/M2 | HEART RATE: 106 BPM | WEIGHT: 165 LBS | OXYGEN SATURATION: 96 %

## 2020-08-03 DIAGNOSIS — Z79.899 HIGH RISK MEDICATION USE: ICD-10-CM

## 2020-08-03 DIAGNOSIS — Z87.891 HISTORY OF CIGARETTE SMOKING: ICD-10-CM

## 2020-08-03 DIAGNOSIS — M79.10 MYALGIA: ICD-10-CM

## 2020-08-03 DIAGNOSIS — Z78.0 MENOPAUSE: ICD-10-CM

## 2020-08-03 DIAGNOSIS — J44.9 CHRONIC OBSTRUCTIVE PULMONARY DISEASE, UNSPECIFIED COPD TYPE (HCC): Primary | ICD-10-CM

## 2020-08-03 PROCEDURE — 99213 OFFICE O/P EST LOW 20 MIN: CPT | Performed by: FAMILY MEDICINE

## 2020-08-03 RX ORDER — DULOXETIN HYDROCHLORIDE 30 MG/1
CAPSULE, DELAYED RELEASE ORAL
COMMUNITY
Start: 2020-07-05 | End: 2020-10-12

## 2020-08-03 NOTE — PROGRESS NOTES
HPI  Miguelina Cruz is a 60 y.o. female who is here for follow up of COPD and general myalgias.  Overall seems to be doing extremely well on current regimen.  Recently started yoga and concerned about muscle aches and pains.  This was discussed.  Also ask about treatment for COPD and specifically pulmonary consultation.  Remains off cigarettes for several years and other treatment options discussed.  Is doing spirometry at home also.  All of this was discussed.  Patient would like to recheck pulmonary function test to see status.  Also reviewed recent chest CT as well as Cologuard etc.      Review of Systems   Musculoskeletal: Positive for myalgias.   All other systems reviewed and are negative.        Past Medical History:   Diagnosis Date   • ADHD    • COPD (chronic obstructive pulmonary disease) (CMS/HCC)    • Fibromyalgia        Past Surgical History:   Procedure Laterality Date   • FOOT SURGERY         Family History   Problem Relation Age of Onset   • Lung disease Mother    • Other Other         malignant neoplasm   • Other Other         malignant neoplasm       Social History     Socioeconomic History   • Marital status: Single     Spouse name: Not on file   • Number of children: Not on file   • Years of education: Not on file   • Highest education level: Not on file   Tobacco Use   • Smoking status: Former Smoker     Packs/day: 1.00     Years: 30.00     Pack years: 30.00     Types: Cigarettes     Last attempt to quit: 8/3/1990     Years since quittin.0   • Smokeless tobacco: Never Used   • Tobacco comment: Pt quit smoking 5 yrs ago    Substance and Sexual Activity   • Alcohol use: Yes     Comment: OCCASIONAL   • Drug use: No     Types: Hydrocodone   • Sexual activity: Never         Physical Exam   Constitutional: She is oriented to person, place, and time. She appears well-developed and well-nourished.   HENT:   Head: Normocephalic and atraumatic.   Eyes: Pupils are equal, round, and reactive to light.  Conjunctivae and EOM are normal.   Cardiovascular: Normal rate and regular rhythm.   Pulmonary/Chest: Effort normal and breath sounds normal. No respiratory distress.   Musculoskeletal: Normal range of motion. She exhibits no edema or deformity.   Neurological: She is alert and oriented to person, place, and time. Coordination normal.   Skin: Skin is dry.   Psychiatric: She has a normal mood and affect. Her behavior is normal. Judgment and thought content normal.   Nursing note and vitals reviewed.        Assessment/Plan    Miguelina was seen today for copd.    Diagnoses and all orders for this visit:    Chronic obstructive pulmonary disease, unspecified COPD type (CMS/HCC)  -     Pulmonary Function Test; Future    High risk medication use    History of cigarette smoking  -     Pulmonary Function Test; Future    Myalgia    Menopause      Patient here for routine follow-up of above-noted medical problems.  Does continue controlled medications and will be monitored every 3 months.  Also requesting repeat pulmonary function test and other treatments for COPD.  Again told main treatment is remaining off cigarettes.  No evidence of abuse nor misuse of medication which will be continued with close monitoring.    This note includes information entered using a voice recognition dictation system.  Though reviewed, some nonsensible errors may remain.    Also discussed recommendations for pneumonia vaccine, patient will check with her insurance regarding coverage?

## 2020-08-12 DIAGNOSIS — M79.10 MYALGIA: ICD-10-CM

## 2020-08-12 RX ORDER — HYDROCODONE BITARTRATE AND ACETAMINOPHEN 10; 325 MG/1; MG/1
1 TABLET ORAL EVERY 6 HOURS PRN
Qty: 100 TABLET | Refills: 0 | Status: SHIPPED | OUTPATIENT
Start: 2020-08-12 | End: 2020-09-08 | Stop reason: SDUPTHER

## 2020-08-12 NOTE — TELEPHONE ENCOUNTER
Caller: Cruz Miguelina    Relationship: Self    Best call back number: [phone]       Medication needed:   Requested Prescriptions     Pending Prescriptions Disp Refills   • HYDROcodone-acetaminophen (Norco)  MG per tablet 100 tablet 0     Sig: Take 1 tablet by mouth Every 6 (Six) Hours As Needed for Moderate Pain .       When do you need the refill by: TODAY    What details did the patient provide when requesting the medication: N/A    Does the patient have less than a 3 day supply:  [x] Yes  [] No    What is the patient's preferred pharmacy: [facility] [address] [place] [address] AT [facility] & (DAYAN ECHAVAR[facility]A) - [phone] [facility] [phone] FX

## 2020-09-08 DIAGNOSIS — M79.10 MYALGIA: ICD-10-CM

## 2020-09-08 RX ORDER — HYDROCODONE BITARTRATE AND ACETAMINOPHEN 10; 325 MG/1; MG/1
1 TABLET ORAL EVERY 6 HOURS PRN
Qty: 100 TABLET | Refills: 0 | Status: SHIPPED | OUTPATIENT
Start: 2020-09-08 | End: 2020-09-30 | Stop reason: SDUPTHER

## 2020-09-30 DIAGNOSIS — M79.10 MYALGIA: ICD-10-CM

## 2020-10-01 DIAGNOSIS — J44.9 CHRONIC OBSTRUCTIVE PULMONARY DISEASE, UNSPECIFIED COPD TYPE (HCC): ICD-10-CM

## 2020-10-01 RX ORDER — HYDROCODONE BITARTRATE AND ACETAMINOPHEN 10; 325 MG/1; MG/1
1 TABLET ORAL EVERY 6 HOURS PRN
Qty: 100 TABLET | Refills: 0 | Status: SHIPPED | OUTPATIENT
Start: 2020-10-01 | End: 2020-10-22 | Stop reason: SDUPTHER

## 2020-10-05 RX ORDER — TIOTROPIUM BROMIDE AND OLODATEROL 3.124; 2.736 UG/1; UG/1
SPRAY, METERED RESPIRATORY (INHALATION)
Qty: 1 INHALER | Refills: 12 | Status: SHIPPED | OUTPATIENT
Start: 2020-10-05 | End: 2021-11-01

## 2020-10-05 RX ORDER — IBUPROFEN 400 MG/1
TABLET ORAL
Qty: 50 TABLET | Refills: 4 | Status: SHIPPED | OUTPATIENT
Start: 2020-10-05

## 2020-10-12 ENCOUNTER — OFFICE VISIT (OUTPATIENT)
Dept: FAMILY MEDICINE CLINIC | Facility: CLINIC | Age: 60
End: 2020-10-12

## 2020-10-12 ENCOUNTER — HOSPITAL ENCOUNTER (OUTPATIENT)
Dept: GENERAL RADIOLOGY | Facility: HOSPITAL | Age: 60
Discharge: HOME | End: 2020-10-12
Admitting: FAMILY MEDICINE

## 2020-10-12 VITALS
DIASTOLIC BLOOD PRESSURE: 80 MMHG | HEIGHT: 65 IN | RESPIRATION RATE: 20 BRPM | HEART RATE: 95 BPM | TEMPERATURE: 96.9 F | OXYGEN SATURATION: 97 % | SYSTOLIC BLOOD PRESSURE: 122 MMHG | BODY MASS INDEX: 27.52 KG/M2 | WEIGHT: 165.2 LBS

## 2020-10-12 DIAGNOSIS — Z79.899 HIGH RISK MEDICATION USE: Primary | ICD-10-CM

## 2020-10-12 DIAGNOSIS — G89.29 ELBOW PAIN, CHRONIC, RIGHT: ICD-10-CM

## 2020-10-12 DIAGNOSIS — M25.521 ELBOW PAIN, CHRONIC, RIGHT: ICD-10-CM

## 2020-10-12 PROCEDURE — 99213 OFFICE O/P EST LOW 20 MIN: CPT | Performed by: FAMILY MEDICINE

## 2020-10-12 PROCEDURE — 90471 IMMUNIZATION ADMIN: CPT | Performed by: FAMILY MEDICINE

## 2020-10-12 PROCEDURE — 73070 X-RAY EXAM OF ELBOW: CPT

## 2020-10-12 PROCEDURE — 90686 IIV4 VACC NO PRSV 0.5 ML IM: CPT | Performed by: FAMILY MEDICINE

## 2020-10-12 RX ORDER — DULOXETIN HYDROCHLORIDE 60 MG/1
CAPSULE, DELAYED RELEASE ORAL
COMMUNITY
Start: 2020-09-14

## 2020-10-12 NOTE — PROGRESS NOTES
HPI  Miguelina Cruz is a 60 y.o. female who is here for right elbow pain for the last 3 to 4 weeks.  Patient has started doing yoga as well as lifting some weight.  This has helped her fibromyalgia but again has noted pain especially in the elbow both with extension and flexion?      Review of Systems   Musculoskeletal: Positive for arthralgias.   All other systems reviewed and are negative.        Past Medical History:   Diagnosis Date   • ADHD    • COPD (chronic obstructive pulmonary disease) (CMS/HCC)    • Fibromyalgia        Past Surgical History:   Procedure Laterality Date   • FOOT SURGERY         Family History   Problem Relation Age of Onset   • Lung disease Mother    • Other Other         malignant neoplasm   • Other Other         malignant neoplasm       Social History     Socioeconomic History   • Marital status: Single     Spouse name: Not on file   • Number of children: Not on file   • Years of education: Not on file   • Highest education level: Not on file   Tobacco Use   • Smoking status: Former Smoker     Packs/day: 1.00     Years: 30.00     Pack years: 30.00     Types: Cigarettes     Quit date: 8/3/1990     Years since quittin.2   • Smokeless tobacco: Never Used   • Tobacco comment: Pt quit smoking 5 yrs ago    Substance and Sexual Activity   • Alcohol use: Yes     Comment: OCCASIONAL   • Drug use: No     Types: Hydrocodone   • Sexual activity: Never       Vitals:    10/12/20 0845   BP: 122/80   Pulse: 95   Resp: 20   Temp: 96.9 °F (36.1 °C)   SpO2: 97%        Body mass index is 27.49 kg/m².      Physical Exam  Vitals signs reviewed. Exam conducted with a chaperone present.   Constitutional:       General: She is not in acute distress.     Appearance: Normal appearance. She is not ill-appearing.   HENT:      Head: Normocephalic and atraumatic.   Eyes:      Extraocular Movements: Extraocular movements intact.      Conjunctiva/sclera: Conjunctivae normal.      Pupils: Pupils are equal, round, and  reactive to light.   Neck:      Musculoskeletal: Normal range of motion.   Cardiovascular:      Rate and Rhythm: Normal rate and regular rhythm.   Pulmonary:      Effort: Pulmonary effort is normal. No respiratory distress.      Breath sounds: Normal breath sounds.   Musculoskeletal: Normal range of motion.         General: No swelling, tenderness or deformity.      Right elbow: She exhibits normal range of motion, no swelling, no effusion, no deformity and no laceration. No tenderness found. No radial head, no medial epicondyle, no lateral epicondyle and no olecranon process tenderness noted.        Arms:    Skin:     General: Skin is warm and dry.   Neurological:      General: No focal deficit present.      Mental Status: She is alert and oriented to person, place, and time.   Psychiatric:         Mood and Affect: Mood normal.         Behavior: Behavior normal.         Thought Content: Thought content normal.         Judgment: Judgment normal.           Assessment/Plan    Miguelina was seen today for upper extremity issue.    Diagnoses and all orders for this visit:    High risk medication use  -     CBC & Differential  -     C-reactive Protein  -     Comprehensive Metabolic Panel  -     Sedimentation Rate    Elbow pain, chronic, right  -     CBC & Differential  -     C-reactive Protein  -     Sedimentation Rate  -     XR elbow 2 vw right; Future      Patient presents with right elbow pain possibly exacerbated by exercise program.  This was discussed.  Recommend decreasing daily exercise.  Is due for routine lab as noted above and will add inflammatory tests and send for x-ray.  Otherwise keep routine follow-up appointment next month.    This note includes information entered using a voice recognition dictation system.  Though reviewed, some nonsensible errors may remain.

## 2020-10-13 LAB
ALBUMIN SERPL-MCNC: 5 G/DL (ref 3.5–5.2)
ALBUMIN/GLOB SERPL: 2.5 G/DL
ALP SERPL-CCNC: 80 U/L (ref 39–117)
ALT SERPL-CCNC: 26 U/L (ref 1–33)
AST SERPL-CCNC: 37 U/L (ref 1–32)
BASOPHILS # BLD AUTO: 0.04 10*3/MM3 (ref 0–0.2)
BASOPHILS NFR BLD AUTO: 0.6 % (ref 0–1.5)
BILIRUB SERPL-MCNC: 0.3 MG/DL (ref 0–1.2)
BUN SERPL-MCNC: 20 MG/DL (ref 6–20)
BUN/CREAT SERPL: 23.5 (ref 7–25)
CALCIUM SERPL-MCNC: 9.7 MG/DL (ref 8.6–10.5)
CHLORIDE SERPL-SCNC: 100 MMOL/L (ref 98–107)
CO2 SERPL-SCNC: 26.8 MMOL/L (ref 22–29)
CREAT SERPL-MCNC: 0.85 MG/DL (ref 0.57–1)
CRP SERPL-MCNC: 0.3 MG/DL (ref 0–0.5)
EOSINOPHIL # BLD AUTO: 0.2 10*3/MM3 (ref 0–0.4)
EOSINOPHIL NFR BLD AUTO: 2.8 % (ref 0.3–6.2)
ERYTHROCYTE [DISTWIDTH] IN BLOOD BY AUTOMATED COUNT: 12.3 % (ref 12.3–15.4)
ERYTHROCYTE [SEDIMENTATION RATE] IN BLOOD BY WESTERGREN METHOD: 14 MM/HR (ref 0–30)
GLOBULIN SER CALC-MCNC: 2 GM/DL
GLUCOSE SERPL-MCNC: 70 MG/DL (ref 65–99)
HCT VFR BLD AUTO: 41.7 % (ref 34–46.6)
HGB BLD-MCNC: 14.2 G/DL (ref 12–15.9)
IMM GRANULOCYTES # BLD AUTO: 0.01 10*3/MM3 (ref 0–0.05)
IMM GRANULOCYTES NFR BLD AUTO: 0.1 % (ref 0–0.5)
LYMPHOCYTES # BLD AUTO: 1.59 10*3/MM3 (ref 0.7–3.1)
LYMPHOCYTES NFR BLD AUTO: 22.4 % (ref 19.6–45.3)
MCH RBC QN AUTO: 32.8 PG (ref 26.6–33)
MCHC RBC AUTO-ENTMCNC: 34.1 G/DL (ref 31.5–35.7)
MCV RBC AUTO: 96.3 FL (ref 79–97)
MONOCYTES # BLD AUTO: 0.41 10*3/MM3 (ref 0.1–0.9)
MONOCYTES NFR BLD AUTO: 5.8 % (ref 5–12)
NEUTROPHILS # BLD AUTO: 4.85 10*3/MM3 (ref 1.7–7)
NEUTROPHILS NFR BLD AUTO: 68.3 % (ref 42.7–76)
NRBC BLD AUTO-RTO: 0 /100 WBC (ref 0–0.2)
PLATELET # BLD AUTO: 326 10*3/MM3 (ref 140–450)
POTASSIUM SERPL-SCNC: 5.1 MMOL/L (ref 3.5–5.2)
PROT SERPL-MCNC: 7 G/DL (ref 6–8.5)
RBC # BLD AUTO: 4.33 10*6/MM3 (ref 3.77–5.28)
SODIUM SERPL-SCNC: 137 MMOL/L (ref 136–145)
WBC # BLD AUTO: 7.1 10*3/MM3 (ref 3.4–10.8)

## 2020-10-22 DIAGNOSIS — M79.10 MYALGIA: ICD-10-CM

## 2020-10-23 RX ORDER — HYDROCODONE BITARTRATE AND ACETAMINOPHEN 10; 325 MG/1; MG/1
1 TABLET ORAL EVERY 6 HOURS PRN
Qty: 100 TABLET | Refills: 0 | Status: SHIPPED | OUTPATIENT
Start: 2020-10-23 | End: 2020-11-13 | Stop reason: SDUPTHER

## 2020-11-03 ENCOUNTER — OFFICE VISIT (OUTPATIENT)
Dept: FAMILY MEDICINE CLINIC | Facility: CLINIC | Age: 60
End: 2020-11-03

## 2020-11-03 VITALS
HEART RATE: 69 BPM | WEIGHT: 168.8 LBS | SYSTOLIC BLOOD PRESSURE: 128 MMHG | BODY MASS INDEX: 28.12 KG/M2 | TEMPERATURE: 96 F | HEIGHT: 65 IN | DIASTOLIC BLOOD PRESSURE: 80 MMHG

## 2020-11-03 DIAGNOSIS — F98.8 ATTENTION DEFICIT DISORDER, UNSPECIFIED HYPERACTIVITY PRESENCE: ICD-10-CM

## 2020-11-03 DIAGNOSIS — M79.10 MYALGIA: ICD-10-CM

## 2020-11-03 DIAGNOSIS — R63.5 ABNORMAL WEIGHT GAIN: Primary | ICD-10-CM

## 2020-11-03 PROCEDURE — 99213 OFFICE O/P EST LOW 20 MIN: CPT | Performed by: FAMILY MEDICINE

## 2020-11-03 NOTE — PROGRESS NOTES
HPI  Miguelina Cruz is a 60 y.o. female who is here for follow up of attention deficit disorder as well as fibromyalgia and COPD.  Patient continues to do yoga which helps her fibromyalgia.  Overall doing well on current medication.  Concerned about continued weight gain despite no change in diet.  All of this was discussed.  Apparently does remain off cigarettes which is strongly encouraged.      Review of Systems   Constitutional: Positive for unexpected weight change.   All other systems reviewed and are negative.        Past Medical History:   Diagnosis Date   • ADHD    • COPD (chronic obstructive pulmonary disease) (CMS/HCC)    • Fibromyalgia        Past Surgical History:   Procedure Laterality Date   • FOOT SURGERY         Family History   Problem Relation Age of Onset   • Lung disease Mother    • Other Other         malignant neoplasm   • Other Other         malignant neoplasm       Social History     Socioeconomic History   • Marital status: Single     Spouse name: Not on file   • Number of children: Not on file   • Years of education: Not on file   • Highest education level: Not on file   Tobacco Use   • Smoking status: Former Smoker     Packs/day: 1.00     Years: 30.00     Pack years: 30.00     Types: Cigarettes     Quit date: 8/3/1990     Years since quittin.2   • Smokeless tobacco: Never Used   • Tobacco comment: Pt quit smoking 5 yrs ago    Substance and Sexual Activity   • Alcohol use: Yes     Comment: OCCASIONAL   • Drug use: No     Types: Hydrocodone   • Sexual activity: Never       Vitals:    20 0821   BP: 128/80   Pulse: 69   Temp: 96 °F (35.6 °C)        Body mass index is 28.09 kg/m².      Physical Exam  Vitals signs and nursing note reviewed.   Constitutional:       General: She is not in acute distress.     Appearance: Normal appearance. She is normal weight. She is not ill-appearing.   HENT:      Head: Normocephalic and atraumatic.   Eyes:      Extraocular Movements: Extraocular  movements intact.      Conjunctiva/sclera: Conjunctivae normal.      Pupils: Pupils are equal, round, and reactive to light.   Cardiovascular:      Rate and Rhythm: Normal rate and regular rhythm.   Pulmonary:      Effort: Pulmonary effort is normal. No respiratory distress.   Neurological:      General: No focal deficit present.      Mental Status: She is alert and oriented to person, place, and time. Mental status is at baseline.   Psychiatric:         Mood and Affect: Mood normal.         Behavior: Behavior normal.         Thought Content: Thought content normal.         Judgment: Judgment normal.           Assessment/Plan    Diagnoses and all orders for this visit:    1. Abnormal weight gain (Primary)  -     TSH+Free T4    2. Myalgia    3. Attention deficit disorder, unspecified hyperactivity presence        Patient here for routine follow-up especially of fibromyalgia and attention deficit disorder.  Remains on controlled medication but no evidence of abuse nor misuse of medication which will be continued with close monitoring including office visits every 3 months.  Patient reports weight gain.  Requesting thyroid level which will be obtained.  Mother apparently had thyroid difficulties.    Patient wore mask during visit.  Provider will mask and face shield per guidelines.    This note includes information entered using a voice recognition dictation system.  Though reviewed, some nonsensible errors may remain.      .

## 2020-11-04 LAB
T4 FREE SERPL-MCNC: 0.97 NG/DL (ref 0.93–1.7)
TSH SERPL DL<=0.005 MIU/L-ACNC: 0.75 UIU/ML (ref 0.27–4.2)

## 2020-11-13 DIAGNOSIS — M79.10 MYALGIA: ICD-10-CM

## 2020-11-16 DIAGNOSIS — M79.10 MYALGIA: ICD-10-CM

## 2020-11-16 RX ORDER — HYDROCODONE BITARTRATE AND ACETAMINOPHEN 10; 325 MG/1; MG/1
1 TABLET ORAL EVERY 6 HOURS PRN
Qty: 100 TABLET | Refills: 0 | OUTPATIENT
Start: 2020-11-16

## 2020-11-16 NOTE — TELEPHONE ENCOUNTER
Caller: Anthony Miguelina    Relationship: Self    Best call back number: [phone]    Medication needed:   Requested Prescriptions     Pending Prescriptions Disp Refills   • HYDROcodone-acetaminophen (Norco)  MG per tablet 100 tablet 0     Sig: Take 1 tablet by mouth Every 6 (Six) Hours As Needed for Moderate Pain .       When do you need the refill by: 11/16/20    What details did the patient provide when requesting the medication: n/a    Does the patient have less than a 3 day supply:  [x] Yes  [] No    What is the patient's preferred pharmacy:    [facility]ANTHA [address] [place] [address] BYPASS AT [facility] & (DAYAN ECHAVARRIA) - [phone] [facility] [phone]   [phone]

## 2020-11-17 DIAGNOSIS — M79.10 MYALGIA: ICD-10-CM

## 2020-11-17 RX ORDER — HYDROCODONE BITARTRATE AND ACETAMINOPHEN 10; 325 MG/1; MG/1
1 TABLET ORAL EVERY 6 HOURS PRN
Qty: 100 TABLET | Refills: 0 | Status: SHIPPED | OUTPATIENT
Start: 2020-11-17 | End: 2020-12-07 | Stop reason: SDUPTHER

## 2020-11-18 RX ORDER — HYDROCODONE BITARTRATE AND ACETAMINOPHEN 10; 325 MG/1; MG/1
1 TABLET ORAL EVERY 6 HOURS PRN
Qty: 100 TABLET | Refills: 0 | OUTPATIENT
Start: 2020-11-18

## 2020-11-18 NOTE — TELEPHONE ENCOUNTER
"Hub please inform patient, \" THIS MEDICATION WAS APPROVED BY PROVIDER AND SENT TO THE PHARMACY, THE INSURANCE PROVIDER IS REQUEST A PA IN ORDER FOR THE MEDICATION TO BE APPROVED. WE HAVE SUBMITTED THE PA TO THE INSURANCE COMPANY WE ARE WAITING ON A RESPONSE. UNTIL THE THE PHARMACY WILL NOT RELEASE THE MEDICATION.\"    "

## 2020-11-19 ENCOUNTER — TELEPHONE (OUTPATIENT)
Dept: FAMILY MEDICINE CLINIC | Facility: CLINIC | Age: 60
End: 2020-11-19

## 2020-12-07 DIAGNOSIS — M79.10 MYALGIA: ICD-10-CM

## 2020-12-07 RX ORDER — HYDROCODONE BITARTRATE AND ACETAMINOPHEN 10; 325 MG/1; MG/1
1 TABLET ORAL EVERY 6 HOURS PRN
Qty: 100 TABLET | Refills: 0 | Status: SHIPPED | OUTPATIENT
Start: 2020-12-07 | End: 2020-12-29 | Stop reason: SDUPTHER

## 2020-12-10 DIAGNOSIS — J44.9 CHRONIC OBSTRUCTIVE PULMONARY DISEASE, UNSPECIFIED COPD TYPE (HCC): ICD-10-CM

## 2020-12-10 RX ORDER — ALBUTEROL SULFATE 90 UG/1
AEROSOL, METERED RESPIRATORY (INHALATION)
Qty: 18 G | Refills: 11 | Status: SHIPPED | OUTPATIENT
Start: 2020-12-10 | End: 2021-11-05

## 2020-12-29 ENCOUNTER — TELEPHONE (OUTPATIENT)
Dept: FAMILY MEDICINE CLINIC | Facility: CLINIC | Age: 60
End: 2020-12-29

## 2020-12-29 DIAGNOSIS — M79.10 MYALGIA: ICD-10-CM

## 2020-12-29 RX ORDER — HYDROCODONE BITARTRATE AND ACETAMINOPHEN 10; 325 MG/1; MG/1
1 TABLET ORAL EVERY 6 HOURS PRN
Qty: 100 TABLET | Refills: 0 | Status: SHIPPED | OUTPATIENT
Start: 2020-12-29 | End: 2021-01-21 | Stop reason: SDUPTHER

## 2020-12-29 RX ORDER — HYDROCODONE BITARTRATE AND ACETAMINOPHEN 10; 325 MG/1; MG/1
1 TABLET ORAL EVERY 6 HOURS PRN
Qty: 100 TABLET | Refills: 0 | Status: CANCELLED | OUTPATIENT
Start: 2020-12-29

## 2020-12-29 NOTE — TELEPHONE ENCOUNTER
/Caller: Miguelina Cruz    Relationship: Self    Best call back number: [phone]    Medication needed:   Requested Prescriptions     Pending Prescriptions Disp Refills   • HYDROcodone-acetaminophen (Norco)  MG per tablet 100 tablet 0     Sig: Take 1 tablet by mouth Every 6 (Six) Hours As Needed for Moderate Pain .       When do you need the refill by: 1/2/20    What details did the patient provide when requesting the medication: PATIENT STATED THAT SHE CURRENTLY HAS ENOUGH MEDICATION TO GET HER THROUGH UNTIL SATURDAY (1/2/20).    Does the patient have less than a 3 day supply:  [] Yes  [x] No    What is the patient's preferred pharmacy: SAMANTHA [address] [place] [address] AT [facility] & (DAYAN ECHAVARRIA) - [phone] [facility] [phone] FX

## 2020-12-30 DIAGNOSIS — M79.10 MYALGIA: ICD-10-CM

## 2020-12-31 RX ORDER — HYDROCODONE BITARTRATE AND ACETAMINOPHEN 10; 325 MG/1; MG/1
1 TABLET ORAL EVERY 6 HOURS PRN
Qty: 100 TABLET | Refills: 0 | OUTPATIENT
Start: 2020-12-31

## 2021-01-21 DIAGNOSIS — M79.10 MYALGIA: ICD-10-CM

## 2021-01-21 RX ORDER — HYDROCODONE BITARTRATE AND ACETAMINOPHEN 10; 325 MG/1; MG/1
1 TABLET ORAL EVERY 6 HOURS PRN
Qty: 100 TABLET | Refills: 0 | Status: SHIPPED | OUTPATIENT
Start: 2021-01-21 | End: 2021-02-15 | Stop reason: SDUPTHER

## 2021-01-21 NOTE — TELEPHONE ENCOUNTER
Caller: Anthony Miguelina    Relationship: Self    Best call back number: [phone]    Medication needed:   Requested Prescriptions     Pending Prescriptions Disp Refills   • HYDROcodone-acetaminophen (Norco)  MG per tablet 100 tablet 0     Sig: Take 1 tablet by mouth Every 6 (Six) Hours As Needed for Moderate Pain .       When do you need the refill by: 01/25/2021      Does the patient have less than a 3 day supply:  [x] Yes  [] No    What is the patient's preferred pharmacy: SAMANTHA [address] AT [facility] & (DAYAN ECHAVARRIA) - [phone] [facility] [phone] FX

## 2021-02-08 ENCOUNTER — OFFICE VISIT (OUTPATIENT)
Dept: FAMILY MEDICINE CLINIC | Facility: CLINIC | Age: 61
End: 2021-02-08

## 2021-02-08 VITALS
HEART RATE: 88 BPM | OXYGEN SATURATION: 98 % | BODY MASS INDEX: 26.56 KG/M2 | HEIGHT: 65 IN | SYSTOLIC BLOOD PRESSURE: 118 MMHG | TEMPERATURE: 96 F | DIASTOLIC BLOOD PRESSURE: 82 MMHG | WEIGHT: 159.4 LBS | RESPIRATION RATE: 18 BRPM

## 2021-02-08 DIAGNOSIS — Z87.891 HISTORY OF CIGARETTE SMOKING: ICD-10-CM

## 2021-02-08 DIAGNOSIS — M79.10 MYALGIA: ICD-10-CM

## 2021-02-08 DIAGNOSIS — J44.9 CHRONIC OBSTRUCTIVE PULMONARY DISEASE, UNSPECIFIED COPD TYPE (HCC): ICD-10-CM

## 2021-02-08 DIAGNOSIS — M77.11 LATERAL EPICONDYLITIS OF RIGHT ELBOW: ICD-10-CM

## 2021-02-08 DIAGNOSIS — F98.8 ATTENTION DEFICIT DISORDER, UNSPECIFIED HYPERACTIVITY PRESENCE: Primary | ICD-10-CM

## 2021-02-08 DIAGNOSIS — Z79.899 HIGH RISK MEDICATION USE: ICD-10-CM

## 2021-02-08 PROCEDURE — 99213 OFFICE O/P EST LOW 20 MIN: CPT | Performed by: FAMILY MEDICINE

## 2021-02-08 NOTE — PROGRESS NOTES
HPI  Miguelina Cruz is a 60 y.o. female who is here for follow up of COPD fibromyalgia issues.  Overall doing excellent on current regimen.  Has been doing yoga and breathing exercises which have improved her fibromyalgia and also breathing issues.  Remains off cigarettes and all of this strongly encouraged.  Also has had some weight loss and blood pressure is much improved.  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  Discussed etiology of probable tendinitis of elbow.  Treatment options including forearm band also discussed.      Review of Systems   Musculoskeletal: Positive for arthralgias and myalgias.   All other systems reviewed and are negative.        Past Medical History:   Diagnosis Date   • ADHD    • COPD (chronic obstructive pulmonary disease) (CMS/HCC)    • Fibromyalgia        Past Surgical History:   Procedure Laterality Date   • FOOT SURGERY         Family History   Problem Relation Age of Onset   • Lung disease Mother    • Other Other         malignant neoplasm   • Other Other         malignant neoplasm       Social History     Socioeconomic History   • Marital status: Single     Spouse name: Not on file   • Number of children: Not on file   • Years of education: Not on file   • Highest education level: Not on file   Tobacco Use   • Smoking status: Former Smoker     Packs/day: 1.00     Years: 30.00     Pack years: 30.00     Types: Cigarettes     Quit date: 8/3/1990     Years since quittin.5   • Smokeless tobacco: Never Used   • Tobacco comment: Pt quit smoking 5 yrs ago    Substance and Sexual Activity   • Alcohol use: Yes     Comment: OCCASIONAL   • Drug use: No     Types: Hydrocodone   • Sexual activity: Never       Vitals:    21 0832   BP: 118/82   Pulse: 88   Resp: 18   Temp: 96 °F (35.6 °C)   SpO2: 98%        Body mass index is 26.53 kg/m².      Physical Exam  Vitals signs and nursing note reviewed.   Constitutional:       General: She is not in acute  distress.     Appearance: Normal appearance. She is well-developed and normal weight. She is not ill-appearing.   HENT:      Head: Normocephalic and atraumatic.      Nose:      Comments: Patient with mask.  Provider with mask and shield  Eyes:      Conjunctiva/sclera: Conjunctivae normal.      Pupils: Pupils are equal, round, and reactive to light.   Neck:      Musculoskeletal: Normal range of motion.      Thyroid: No thyromegaly.   Cardiovascular:      Rate and Rhythm: Normal rate and regular rhythm.   Pulmonary:      Effort: Pulmonary effort is normal. No respiratory distress.   Abdominal:      General: There is no distension.      Palpations: Abdomen is soft. There is no mass.      Tenderness: There is no abdominal tenderness.      Hernia: No hernia is present.   Musculoskeletal: Normal range of motion.         General: No tenderness or deformity.   Lymphadenopathy:      Cervical: No cervical adenopathy.   Skin:     General: Skin is warm and dry.      Coloration: Skin is not pale.      Findings: No rash.   Neurological:      General: No focal deficit present.      Mental Status: She is alert and oriented to person, place, and time.      Motor: No abnormal muscle tone.      Coordination: Coordination normal.   Psychiatric:         Mood and Affect: Mood normal.         Behavior: Behavior normal.         Thought Content: Thought content normal.         Judgment: Judgment normal.           Assessment/Plan    Diagnoses and all orders for this visit:    1. Attention deficit disorder, unspecified hyperactivity presence (Primary)    2. Myalgia    3. Chronic obstructive pulmonary disease, unspecified COPD type (CMS/HCC)    4. High risk medication use    5. History of cigarette smoking    6. Lateral epicondylitis of right elbow        Routine follow-up of above-noted medical problems.  Overall doing excellent on current regimen which will be continued including close monitoring of controlled medications every 3 months.  No  evidence of abuse nor misuse of medication which will be monitored.    This note includes information entered using a voice recognition dictation system.  Though reviewed, some nonsensible errors may remain.

## 2021-02-15 DIAGNOSIS — M79.10 MYALGIA: ICD-10-CM

## 2021-02-16 RX ORDER — HYDROCODONE BITARTRATE AND ACETAMINOPHEN 10; 325 MG/1; MG/1
1 TABLET ORAL EVERY 6 HOURS PRN
Qty: 100 TABLET | Refills: 0 | Status: SHIPPED | OUTPATIENT
Start: 2021-02-16 | End: 2021-03-11 | Stop reason: SDUPTHER

## 2021-03-11 DIAGNOSIS — M79.10 MYALGIA: ICD-10-CM

## 2021-03-11 RX ORDER — HYDROCODONE BITARTRATE AND ACETAMINOPHEN 10; 325 MG/1; MG/1
1 TABLET ORAL EVERY 6 HOURS PRN
Qty: 100 TABLET | Refills: 0 | Status: SHIPPED | OUTPATIENT
Start: 2021-03-11 | End: 2021-04-02 | Stop reason: SDUPTHER

## 2021-03-11 NOTE — TELEPHONE ENCOUNTER
Caller: Miguelina Cruz    Relationship: Self    Best call back number: [phone]    Medication needed:   Requested Prescriptions     Pending Prescriptions Disp Refills   • HYDROcodone-acetaminophen (Norco)  MG per tablet 100 tablet 0     Sig: Take 1 tablet by mouth Every 6 (Six) Hours As Needed for Moderate Pain .       When do you need the refill by: 3/12/2021    What details did the patient provide when requesting the medication: PATIENT CALLED TO REQUEST MEDICATION REFILL. CALL WAS DROPPED BEFORE COMPLETED. REQUEST SENT AND VOICEMAIL LEFT FOR PATIENT-HUB    Does the patient have less than a 3 day supply:  [x] Yes  [] No    What is the patient's preferred pharmacy: [address] [place] [address] AT [facility] & (DAYAN ECHAVARRIA) - [phone] [facility] [phone] FX

## 2021-03-24 ENCOUNTER — BULK ORDERING (OUTPATIENT)
Dept: CASE MANAGEMENT | Facility: OTHER | Age: 61
End: 2021-03-24

## 2021-03-24 DIAGNOSIS — Z23 IMMUNIZATION DUE: ICD-10-CM

## 2021-04-02 DIAGNOSIS — M79.10 MYALGIA: ICD-10-CM

## 2021-04-02 RX ORDER — HYDROCODONE BITARTRATE AND ACETAMINOPHEN 10; 325 MG/1; MG/1
1 TABLET ORAL EVERY 6 HOURS PRN
Qty: 100 TABLET | Refills: 0 | Status: SHIPPED | OUTPATIENT
Start: 2021-04-02 | End: 2021-04-26 | Stop reason: SDUPTHER

## 2021-04-02 NOTE — TELEPHONE ENCOUNTER
Caller: Anthony Miguelina    Relationship: Self    Best call back number: [phone]     Medication needed:   Requested Prescriptions     Pending Prescriptions Disp Refills   • HYDROcodone-acetaminophen (Norco)  MG per tablet 100 tablet 0     Sig: Take 1 tablet by mouth Every 6 (Six) Hours As Needed for Moderate Pain .       When do you need the refill by: ASAP     Does the patient have less than a 3 day supply:  [x] Yes  [] No    What is the patient's preferred pharmacy: [facility]ANTHA [address] AT [facility] & (DAYAN ECHAVARRIA) - [phone] [facility] [phone] FX

## 2021-04-26 DIAGNOSIS — M79.10 MYALGIA: ICD-10-CM

## 2021-04-26 RX ORDER — HYDROCODONE BITARTRATE AND ACETAMINOPHEN 10; 325 MG/1; MG/1
1 TABLET ORAL EVERY 6 HOURS PRN
Qty: 100 TABLET | Refills: 0 | Status: SHIPPED | OUTPATIENT
Start: 2021-04-26 | End: 2021-05-19 | Stop reason: SDUPTHER

## 2021-04-26 NOTE — TELEPHONE ENCOUNTER
Caller: Anthony Miguelina    Relationship: Self    Best call back number: [phone]    Medication needed:   Requested Prescriptions     Pending Prescriptions Disp Refills   • HYDROcodone-acetaminophen (Norco)  MG per tablet 100 tablet 0     Sig: Take 1 tablet by mouth Every 6 (Six) Hours As Needed for Moderate Pain .       When do you need the refill by: ASAP    Does the patient have less than a 3 day supply:  [x] Yes  [] No    What is the patient's preferred pharmacy: [facility] [address] AT [facility] & (DAYAN E[facility]AR[facility]A) - [phone] [facility] [phone] FX

## 2021-05-19 ENCOUNTER — OFFICE VISIT (OUTPATIENT)
Dept: FAMILY MEDICINE CLINIC | Facility: CLINIC | Age: 61
End: 2021-05-19

## 2021-05-19 VITALS
WEIGHT: 154.2 LBS | DIASTOLIC BLOOD PRESSURE: 72 MMHG | HEART RATE: 105 BPM | BODY MASS INDEX: 25.66 KG/M2 | OXYGEN SATURATION: 97 % | SYSTOLIC BLOOD PRESSURE: 124 MMHG

## 2021-05-19 DIAGNOSIS — Z87.891 HISTORY OF CIGARETTE SMOKING: ICD-10-CM

## 2021-05-19 DIAGNOSIS — M79.10 MYALGIA: ICD-10-CM

## 2021-05-19 DIAGNOSIS — J44.9 CHRONIC OBSTRUCTIVE PULMONARY DISEASE, UNSPECIFIED COPD TYPE (HCC): ICD-10-CM

## 2021-05-19 DIAGNOSIS — Z79.899 HIGH RISK MEDICATION USE: Primary | ICD-10-CM

## 2021-05-19 PROCEDURE — 99213 OFFICE O/P EST LOW 20 MIN: CPT | Performed by: FAMILY MEDICINE

## 2021-05-19 RX ORDER — HYDROCODONE BITARTRATE AND ACETAMINOPHEN 10; 325 MG/1; MG/1
1 TABLET ORAL EVERY 6 HOURS PRN
Qty: 100 TABLET | Refills: 0 | Status: SHIPPED | OUTPATIENT
Start: 2021-05-19 | End: 2021-06-11 | Stop reason: SDUPTHER

## 2021-05-19 NOTE — PROGRESS NOTES
HPI  Miguelina Cruz is a 60 y.o. female who is here for follow up of COPD as well as other medical issues including fibromyalgia and chronic pain medication etc.  Patient reports just not feeling well.  Requesting routine lab work and this was discussed.  Also due for new contract and drug screen etc.  Patient is caring for her mother and strongly recommend getting Covid vaccination which patient has been hesitant because of concerns of side effects.  Apparently brother got very sick and ended up having bypass surgery etc.  All of this was discussed.  Long discussion with patient and feel benefits far outweigh any risks of vaccination for both patient and her mother.      Review of Systems   Constitutional: Positive for fatigue. Negative for chills, fever and unexpected weight change.   Neurological: Positive for weakness.   All other systems reviewed and are negative.        Past Medical History:   Diagnosis Date   • ADHD    • COPD (chronic obstructive pulmonary disease) (CMS/HCC)    • Fibromyalgia            Family History   Problem Relation Age of Onset   • Lung disease Mother    • Other Other         malignant neoplasm   • Other Other         malignant neoplasm       Social History     Socioeconomic History   • Marital status: Single     Spouse name: Not on file   • Number of children: Not on file   • Years of education: Not on file   • Highest education level: Not on file   Tobacco Use   • Smoking status: Former Smoker     Packs/day: 1.00     Years: 30.00     Pack years: 30.00     Types: Cigarettes     Quit date: 8/3/1990     Years since quittin.8   • Smokeless tobacco: Never Used   • Tobacco comment: Pt quit smoking 5 yrs ago    Substance and Sexual Activity   • Alcohol use: Yes     Comment: OCCASIONAL   • Drug use: No     Types: Hydrocodone   • Sexual activity: Never       Vitals:    21 0833   BP: 124/72   Pulse: 105   SpO2: 97%        Body mass index is 25.66 kg/m².      Physical Exam  Vitals and  nursing note reviewed.   Constitutional:       General: She is not in acute distress.     Appearance: She is well-developed.   HENT:      Head: Normocephalic and atraumatic.      Nose:      Comments: Patient with mask.  Provider with mask and shield  Eyes:      Conjunctiva/sclera: Conjunctivae normal.      Pupils: Pupils are equal, round, and reactive to light.   Neck:      Thyroid: No thyromegaly.   Cardiovascular:      Rate and Rhythm: Normal rate and regular rhythm.      Heart sounds: Normal heart sounds.   Pulmonary:      Effort: Pulmonary effort is normal. No respiratory distress.      Breath sounds: Normal breath sounds.   Abdominal:      General: There is no distension.      Palpations: Abdomen is soft. There is no mass.      Tenderness: There is no abdominal tenderness.      Hernia: No hernia is present.   Musculoskeletal:         General: No tenderness or deformity. Normal range of motion.      Cervical back: Normal range of motion.   Lymphadenopathy:      Cervical: No cervical adenopathy.   Skin:     General: Skin is warm and dry.      Coloration: Skin is not pale.      Findings: No rash.   Neurological:      General: No focal deficit present.      Mental Status: She is alert and oriented to person, place, and time.      Motor: No abnormal muscle tone.      Coordination: Coordination normal.   Psychiatric:         Mood and Affect: Mood normal.         Behavior: Behavior normal.         Thought Content: Thought content normal.         Judgment: Judgment normal.           Assessment/Plan    Diagnoses and all orders for this visit:    1. High risk medication use (Primary)  -     CBC & Differential  -     Comprehensive Metabolic Panel  -     Compliance Drug Analysis, Ur - Urine, Clean Catch  -     Urinalysis With Microscopic If Indicated (No Culture) - Urine, Clean Catch    2. History of cigarette smoking    3. Myalgia  -     Urinalysis With Microscopic If Indicated (No Culture) - Urine, Clean Catch    4.  Chronic obstructive pulmonary disease, unspecified COPD type (CMS/[facility])          Patient here for routine follow-up of multiple medical issues.  Says just has not been feeling well and would like routine lab work as discussed above.  Also due for 3-month follow-up of pain medication and class II narcotics.  All of this was discussed and do contract reviewed.  Patient also gets Adderall from her psychiatrist.  Strongly recommend Covid vaccine for patient and her mother.  Continue current therapy including follow-up every 3 months with close monitoring of medication.    This note includes information entered using a voice recognition dictation system.  Though reviewed, some nonsensible errors may remain.

## 2021-05-19 NOTE — TELEPHONE ENCOUNTER
Caller: Miguelina Cruz    Relationship: Self    Best call back number: [phone]    Medication needed:   Requested Prescriptions     Pending Prescriptions Disp Refills   • HYDROcodone-acetaminophen (Norco)  MG per tablet 100 tablet 0     Sig: Take 1 tablet by mouth Every 6 (Six) Hours As Needed for Moderate Pain .       When do you need the refill by: 05/21/2021    What additional details did the patient provide when requesting the medication: PATIENT HAS A 2 DAY SUPPLY    Does the patient have less than a 3 day supply:  [x] Yes  [] No    [facility] [address] [address] [place] [address] AT [facility] & (DAY[facility]) - [phone] [facility] [phone] FX

## 2021-05-20 LAB
ALBUMIN SERPL-MCNC: 4.4 G/DL (ref 3.8–4.9)
ALBUMIN/GLOB SERPL: 1.8 {RATIO} (ref 1.2–2.2)
ALP SERPL-CCNC: 80 IU/L (ref 48–121)
ALT SERPL-CCNC: 20 IU/L (ref 0–32)
APPEARANCE UR: CLEAR
AST SERPL-CCNC: 28 IU/L (ref 0–40)
BASOPHILS # BLD AUTO: 0 X10E3/UL (ref 0–0.2)
BASOPHILS NFR BLD AUTO: 0 %
BILIRUB SERPL-MCNC: 0.2 MG/DL (ref 0–1.2)
BILIRUB UR QL STRIP: NEGATIVE
BUN SERPL-MCNC: 17 MG/DL (ref 6–24)
BUN/CREAT SERPL: 22 (ref 9–23)
CALCIUM SERPL-MCNC: 9.2 MG/DL (ref 8.7–10.2)
CHLORIDE SERPL-SCNC: 101 MMOL/L (ref 96–106)
CO2 SERPL-SCNC: 24 MMOL/L (ref 20–29)
COLOR UR: YELLOW
CREAT SERPL-MCNC: 0.78 MG/DL (ref 0.57–1)
EOSINOPHIL # BLD AUTO: 0.2 X10E3/UL (ref 0–0.4)
EOSINOPHIL NFR BLD AUTO: 2 %
ERYTHROCYTE [DISTWIDTH] IN BLOOD BY AUTOMATED COUNT: 12.5 % (ref 11.7–15.4)
GLOBULIN SER CALC-MCNC: 2.4 G/DL (ref 1.5–4.5)
GLUCOSE SERPL-MCNC: 92 MG/DL (ref 65–99)
GLUCOSE UR QL: NEGATIVE
HCT VFR BLD AUTO: 41.2 % (ref 34–46.6)
HGB BLD-MCNC: 13.7 G/DL (ref 11.1–15.9)
HGB UR QL STRIP: NEGATIVE
IMM GRANULOCYTES # BLD AUTO: 0 X10E3/UL (ref 0–0.1)
IMM GRANULOCYTES NFR BLD AUTO: 0 %
KETONES UR QL STRIP: NEGATIVE
LEUKOCYTE ESTERASE UR QL STRIP: NEGATIVE
LYMPHOCYTES # BLD AUTO: 1.8 X10E3/UL (ref 0.7–3.1)
LYMPHOCYTES NFR BLD AUTO: 19 %
MCH RBC QN AUTO: 32.2 PG (ref 26.6–33)
MCHC RBC AUTO-ENTMCNC: 33.3 G/DL (ref 31.5–35.7)
MCV RBC AUTO: 97 FL (ref 79–97)
MICRO URNS: NORMAL
MONOCYTES # BLD AUTO: 0.4 X10E3/UL (ref 0.1–0.9)
MONOCYTES NFR BLD AUTO: 4 %
NEUTROPHILS # BLD AUTO: 7.2 X10E3/UL (ref 1.4–7)
NEUTROPHILS NFR BLD AUTO: 75 %
NITRITE UR QL STRIP: NEGATIVE
PH UR STRIP: 5.5 [PH] (ref 5–7.5)
PLATELET # BLD AUTO: 320 X10E3/UL (ref 150–450)
POTASSIUM SERPL-SCNC: 4 MMOL/L (ref 3.5–5.2)
PROT SERPL-MCNC: 6.8 G/DL (ref 6–8.5)
PROT UR QL STRIP: NEGATIVE
RBC # BLD AUTO: 4.25 X10E6/UL (ref 3.77–5.28)
SODIUM SERPL-SCNC: 139 MMOL/L (ref 134–144)
SP GR UR: 1.02 (ref 1–1.03)
UROBILINOGEN UR STRIP-MCNC: 0.2 MG/DL (ref 0.2–1)
WBC # BLD AUTO: 9.7 X10E3/UL (ref 3.4–10.8)

## 2021-05-25 LAB — DRUGS UR: NORMAL

## 2021-06-11 DIAGNOSIS — M79.10 MYALGIA: ICD-10-CM

## 2021-06-11 RX ORDER — HYDROCODONE BITARTRATE AND ACETAMINOPHEN 10; 325 MG/1; MG/1
1 TABLET ORAL EVERY 6 HOURS PRN
Qty: 100 TABLET | Refills: 0 | Status: SHIPPED | OUTPATIENT
Start: 2021-06-11 | End: 2021-07-02 | Stop reason: SDUPTHER

## 2021-06-11 NOTE — TELEPHONE ENCOUNTER
Caller: Anthony Miguelina    Relationship: Self    Best call back number: [phone]    Medication needed:   Requested Prescriptions     Pending Prescriptions Disp Refills   • HYDROcodone-acetaminophen (Norco)  MG per tablet 100 tablet 0     Sig: Take 1 tablet by mouth Every 6 (Six) Hours As Needed for Moderate Pain .       When do you need the refill by: ASAP    Does the patient have less than a 3 day supply:  [x] Yes  [] No    What is the patient's preferred pharmacy: [facility] [address] AT [facility] & (DAYAN E[facility]) - [phone] [facility] [phone] FX

## 2021-07-02 ENCOUNTER — TELEPHONE (OUTPATIENT)
Dept: FAMILY MEDICINE CLINIC | Facility: CLINIC | Age: 61
End: 2021-07-02

## 2021-07-02 DIAGNOSIS — M79.10 MYALGIA: ICD-10-CM

## 2021-07-02 RX ORDER — HYDROCODONE BITARTRATE AND ACETAMINOPHEN 10; 325 MG/1; MG/1
1 TABLET ORAL EVERY 6 HOURS PRN
Qty: 100 TABLET | Refills: 0 | Status: SHIPPED | OUTPATIENT
Start: 2021-07-02 | End: 2021-07-28 | Stop reason: SDUPTHER

## 2021-07-02 RX ORDER — HYDROCODONE BITARTRATE AND ACETAMINOPHEN 10; 325 MG/1; MG/1
1 TABLET ORAL EVERY 6 HOURS PRN
Qty: 100 TABLET | Refills: 0 | Status: SHIPPED | OUTPATIENT
Start: 2021-07-02 | End: 2021-07-02 | Stop reason: SDUPTHER

## 2021-07-02 NOTE — TELEPHONE ENCOUNTER
Caller: Cruz Miguelina    Relationship: Self    Best call back number:[phone]    Medication needed:   Requested Prescriptions     Pending Prescriptions Disp Refills   • HYDROcodone-acetaminophen (Norco)  MG per tablet 100 tablet 0     Sig: Take 1 tablet by mouth Every 6 (Six) Hours As Needed for Moderate Pain .       When do you need the refill by: ASAP    What additional details did the patient provide when requesting the medication: PATIENT WILL BE OUT ON MONDAY  Does the patient have less than a 3 day supply:  [x] Yes  [] No    What is the patient's preferred pharmacy: [facility] [address] [place] [address] AT [facility] & (DAYAN E[facility]) - [phone] [facility] [phone] FX

## 2021-07-28 DIAGNOSIS — M79.10 MYALGIA: ICD-10-CM

## 2021-07-28 RX ORDER — HYDROCODONE BITARTRATE AND ACETAMINOPHEN 10; 325 MG/1; MG/1
1 TABLET ORAL EVERY 6 HOURS PRN
Qty: 100 TABLET | Refills: 0 | Status: SHIPPED | OUTPATIENT
Start: 2021-07-28 | End: 2021-08-20 | Stop reason: SDUPTHER

## 2021-07-28 NOTE — TELEPHONE ENCOUNTER
Caller: Miguelina rCuz    Relationship: Self    Best call back number: [phone]    Medication needed:   Requested Prescriptions     Pending Prescriptions Disp Refills   • HYDROcodone-acetaminophen (Norco)  MG per tablet 100 tablet 0     Sig: Take 1 tablet by mouth Every 6 (Six) Hours As Needed for Moderate Pain .       When do you need the refill by: 7/29/2021    What additional details did the patient provide when requesting the medication: HAS TWO DAYS OF MEDICATION LEFT.    Does the patient have less than a 3 day supply:  [x] Yes  [] No    What is the patient's [facility] [address] [place] [address] AT [facility] & (DAY[facility] E[facility]) - [phone] [facility] [phone] FX              No pertinent past medical history

## 2021-08-19 ENCOUNTER — OFFICE VISIT (OUTPATIENT)
Dept: FAMILY MEDICINE CLINIC | Facility: CLINIC | Age: 61
End: 2021-08-19

## 2021-08-19 VITALS — DIASTOLIC BLOOD PRESSURE: 80 MMHG | SYSTOLIC BLOOD PRESSURE: 119 MMHG

## 2021-08-19 DIAGNOSIS — M79.10 MYALGIA: ICD-10-CM

## 2021-08-19 DIAGNOSIS — J44.9 CHRONIC OBSTRUCTIVE PULMONARY DISEASE, UNSPECIFIED COPD TYPE (HCC): ICD-10-CM

## 2021-08-19 DIAGNOSIS — Z79.899 HIGH RISK MEDICATION USE: Primary | ICD-10-CM

## 2021-08-19 PROCEDURE — 99441 PR PHYS/QHP TELEPHONE EVALUATION 5-10 MIN: CPT | Performed by: FAMILY MEDICINE

## 2021-08-19 NOTE — PROGRESS NOTES
HPI  Miguelina Cruz is a 61 y.o. female who is here for follow up especially of chronic pain and continuation of controlled medications.  Also needs refill on COPD medications.  Is having to take care of mother and want to avoid any outside contacts.  Has not yet gotten Covid vaccines which are strongly recommended.  This is a telephone visit.      Review of Systems   All other systems reviewed and are negative.        Past Medical History:   Diagnosis Date   • ADHD    • COPD (chronic obstructive pulmonary disease) (CMS/HCC)    • Fibromyalgia        Past Surgical History:   Procedure Laterality Date   • FOOT SURGERY         Family History   Problem Relation Age of Onset   • Lung disease Mother    • Other Other         malignant neoplasm   • Other Other         malignant neoplasm       Social History     Socioeconomic History   • Marital status: Single     Spouse name: Not on file   • Number of children: Not on file   • Years of education: Not on file   • Highest education level: Not on file   Tobacco Use   • Smoking status: Former Smoker     Packs/day: 1.00     Years: 30.00     Pack years: 30.00     Types: Cigarettes     Quit date: 8/3/1990     Years since quittin.0   • Smokeless tobacco: Never Used   • Tobacco comment: Pt quit smoking 5 yrs ago    Substance and Sexual Activity   • Alcohol use: Yes     Comment: OCCASIONAL   • Drug use: No     Types: Hydrocodone   • Sexual activity: Never       Vitals:    21 0948   BP: 119/80        There is no height or weight on file to calculate BMI.      Physical Exam  Pulmonary:      Effort: No respiratory distress.   Neurological:      Mental Status: She is alert and oriented to person, place, and time.   Psychiatric:         Mood and Affect: Mood normal.         Thought Content: Thought content normal.         Judgment: Judgment normal.           Assessment/Plan    Diagnoses and all orders for this visit:    1. High risk medication use (Primary)    2. Chronic  obstructive pulmonary disease, unspecified COPD type (CMS/[facility])    3. Myalgia        Telephone visit especially to pain medication etc. controlled with medications.  Denies any new complaints and seems to be doing well on current regimen which will be continued follow-up every 3 months.  No evidence of abuse nor misuse of medication.    This note includes information entered using a voice recognition dictation system.  Though reviewed, some nonsensible errors may remain.    This visit has been rescheduled as a phone visit to comply with patient safety concerns in accordance with CDC recommendations. Total time of discussion was 6 minutes.

## 2021-08-20 DIAGNOSIS — M79.10 MYALGIA: ICD-10-CM

## 2021-08-20 RX ORDER — HYDROCODONE BITARTRATE AND ACETAMINOPHEN 10; 325 MG/1; MG/1
1 TABLET ORAL EVERY 6 HOURS PRN
Qty: 100 TABLET | Refills: 0 | Status: SHIPPED | OUTPATIENT
Start: 2021-08-20 | End: 2021-09-10 | Stop reason: SDUPTHER

## 2021-08-20 NOTE — TELEPHONE ENCOUNTER
Rx Refill Note  Requested Prescriptions     Pending Prescriptions Disp Refills   • HYDROcodone-acetaminophen (Norco)  MG per tablet 100 tablet 0     Sig: Take 1 tablet by mouth Every 6 (Six) Hours As Needed for Moderate Pain .      Last office visit with prescribing clinician: 8/19/2021      Next office visit with prescribing clinician: Visit date not found            Bernadine Joshi MA  08/20/21, 10:24 EDT

## 2021-09-10 DIAGNOSIS — M79.10 MYALGIA: ICD-10-CM

## 2021-09-10 DIAGNOSIS — M79.10 MYALGIA: Primary | ICD-10-CM

## 2021-09-10 RX ORDER — HYDROCODONE BITARTRATE AND ACETAMINOPHEN 10; 325 MG/1; MG/1
1 TABLET ORAL EVERY 6 HOURS PRN
Qty: 100 TABLET | Refills: 0 | Status: SHIPPED | OUTPATIENT
Start: 2021-09-10 | End: 2021-10-06 | Stop reason: SDUPTHER

## 2021-09-10 NOTE — TELEPHONE ENCOUNTER
Rx Refill Note  Requested Prescriptions     Pending Prescriptions Disp Refills   • HYDROcodone-acetaminophen (Norco)  MG per tablet 100 tablet 0     Sig: Take 1 tablet by mouth Every 6 (Six) Hours As Needed for Moderate Pain .      Last office visit with prescribing clinician: 8/19/2021      Next office visit with prescribing clinician: Visit date not found            Bernadine Joshi MA  09/10/21, 13:14 EDT

## 2021-10-06 DIAGNOSIS — M79.10 MYALGIA: ICD-10-CM

## 2021-10-06 RX ORDER — HYDROCODONE BITARTRATE AND ACETAMINOPHEN 10; 325 MG/1; MG/1
1 TABLET ORAL EVERY 6 HOURS PRN
Qty: 100 TABLET | Refills: 0 | Status: SHIPPED | OUTPATIENT
Start: 2021-10-06 | End: 2021-10-29 | Stop reason: SDUPTHER

## 2021-10-06 NOTE — TELEPHONE ENCOUNTER
Caller: Miguelina Cruz    Relationship: Self    Medication requested (name and dosage): HYDROcodone-acetaminophen (Norco)  MG per tablet    Pharmacy where request should be sent: [facility] [address] AT [facility] & (DAYAN ) - [phone]  - [phone] FX    Additional details provided by patient: THE PATIENT IS ALMOST OUT OF THIS MEDICATION - SHE HAS ONE DOSE LEFT.    Best call back number: [phone]    Does the patient have less than a 3 day supply:  [x] Yes  [] No    Elijah Saleh Rep   10/06/21 09:02 EDT

## 2021-10-06 NOTE — TELEPHONE ENCOUNTER
Rx Refill Note  Requested Prescriptions     Pending Prescriptions Disp Refills   • HYDROcodone-acetaminophen (Norco)  MG per tablet 100 tablet 0     Sig: Take 1 tablet by mouth Every 6 (Six) Hours As Needed for Moderate Pain .      Last office visit with prescribing clinician: 8/19/2021      Next office visit with prescribing clinician: Visit date not found            John Kirkland MA  10/06/21, 09:52 EDT

## 2021-10-29 ENCOUNTER — TELEPHONE (OUTPATIENT)
Dept: FAMILY MEDICINE CLINIC | Facility: CLINIC | Age: 61
End: 2021-10-29

## 2021-10-29 DIAGNOSIS — M79.10 MYALGIA: ICD-10-CM

## 2021-10-29 DIAGNOSIS — J44.9 CHRONIC OBSTRUCTIVE PULMONARY DISEASE, UNSPECIFIED COPD TYPE (HCC): ICD-10-CM

## 2021-10-29 RX ORDER — HYDROCODONE BITARTRATE AND ACETAMINOPHEN 10; 325 MG/1; MG/1
1 TABLET ORAL EVERY 6 HOURS PRN
Qty: 100 TABLET | Refills: 0 | Status: SHIPPED | OUTPATIENT
Start: 2021-10-29 | End: 2021-11-19 | Stop reason: SDUPTHER

## 2021-10-29 NOTE — TELEPHONE ENCOUNTER
Caller: Miguelina Cruz    Relationship: Self          Requested Prescriptions:   Requested Prescriptions     Pending Prescriptions Disp Refills   • HYDROcodone-acetaminophen (Norco)  MG per tablet 100 tablet 0     Sig: Take 1 tablet by mouth Every 6 (Six) Hours As Needed for Moderate Pain .        Pharmacy where request should be sent: [facility] [address] AT [facility] & (DAYAN ) - [phone] [facility] [phone]   [phone]    Additional details provided by patient: PATIENT IS OUT AND DOES NOT HAVE AN APPOINTMENT WITH PAIN MANAGEMENT UNTIL 11/18    Best call back number: [phone]    Does the patient have less than a 3 day supply:  [x] Yes  [] No    Elijah Araiza Rep   10/29/21 11:05 EDT

## 2021-11-01 RX ORDER — TIOTROPIUM BROMIDE AND OLODATEROL 3.124; 2.736 UG/1; UG/1
SPRAY, METERED RESPIRATORY (INHALATION)
Qty: 4 EACH | Refills: 5 | Status: SHIPPED | OUTPATIENT
Start: 2021-11-01 | End: 2022-11-02

## 2021-11-01 NOTE — TELEPHONE ENCOUNTER
Rx Refill Note  Requested Prescriptions     Pending Prescriptions Disp Refills   • Stiolto Respimat 2.5-2.5 MCG/ACT aerosol solution inhaler [Pharmacy Med Name: STIOLTO RESPIMAT INHAL SPRAY] 4 each      Sig: INHALE TWO INHALATION(S) BY MOUTH DAILY      Last office visit with prescribing clinician: 8/19/2021      Next office visit with prescribing clinician: Visit date not found            John Kirkland MA  11/01/21, 07:01 EDT

## 2021-11-19 ENCOUNTER — TELEPHONE (OUTPATIENT)
Dept: FAMILY MEDICINE CLINIC | Facility: CLINIC | Age: 61
End: 2021-11-19

## 2021-11-19 DIAGNOSIS — M79.10 MYALGIA: ICD-10-CM

## 2021-11-19 RX ORDER — HYDROCODONE BITARTRATE AND ACETAMINOPHEN 10; 325 MG/1; MG/1
1 TABLET ORAL EVERY 6 HOURS PRN
Qty: 100 TABLET | Refills: 0 | OUTPATIENT
Start: 2021-11-19

## 2021-11-19 RX ORDER — HYDROCODONE BITARTRATE AND ACETAMINOPHEN 10; 325 MG/1; MG/1
1 TABLET ORAL EVERY 6 HOURS PRN
Qty: 100 TABLET | Refills: 0 | Status: SHIPPED | OUTPATIENT
Start: 2021-11-19 | End: 2022-11-02

## 2021-11-19 NOTE — TELEPHONE ENCOUNTER
Caller: CruzMiguelina abraham    Relationship: Self    Best call back number: [phone]     Requested Prescriptions:   Requested Prescriptions     Pending Prescriptions Disp Refills   • HYDROcodone-acetaminophen (Norco)  MG per tablet 100 tablet 0     Sig: Take 1 tablet by mouth Every 6 (Six) Hours As Needed for Moderate Pain .        Pharmacy where request should be sent: [facility] [address] AT [facility] & (DAYAN )  [phone] [facility] [phone] FX     Additional details provided by patient:     WILL BE OUT SUNDAY      Does the patient have less than a 3 day supply:  [x] Yes  [] No    Elijah Salazar Rep   11/19/21 09:56 EST

## 2022-11-02 ENCOUNTER — OFFICE VISIT (OUTPATIENT)
Dept: FAMILY MEDICINE CLINIC | Facility: CLINIC | Age: 62
End: 2022-11-02

## 2022-11-02 VITALS
WEIGHT: 139.2 LBS | BODY MASS INDEX: 23.19 KG/M2 | HEIGHT: 65 IN | TEMPERATURE: 97.7 F | HEART RATE: 69 BPM | OXYGEN SATURATION: 98 % | DIASTOLIC BLOOD PRESSURE: 94 MMHG | SYSTOLIC BLOOD PRESSURE: 154 MMHG

## 2022-11-02 DIAGNOSIS — M54.2 NECK PAIN: ICD-10-CM

## 2022-11-02 DIAGNOSIS — J44.9 CHRONIC OBSTRUCTIVE PULMONARY DISEASE, UNSPECIFIED COPD TYPE: Primary | ICD-10-CM

## 2022-11-02 DIAGNOSIS — M79.10 MYALGIA: ICD-10-CM

## 2022-11-02 PROCEDURE — 99214 OFFICE O/P EST MOD 30 MIN: CPT | Performed by: NURSE PRACTITIONER

## 2022-11-02 RX ORDER — GABAPENTIN 600 MG/1
600 TABLET ORAL 4 TIMES DAILY
COMMUNITY
Start: 2022-10-12

## 2022-11-02 RX ORDER — BUPROPION HYDROCHLORIDE 75 MG/1
TABLET ORAL
COMMUNITY
Start: 2022-10-29

## 2022-11-02 RX ORDER — ALBUTEROL SULFATE 90 UG/1
2 AEROSOL, METERED RESPIRATORY (INHALATION) EVERY 4 HOURS PRN
Qty: 18 G | Refills: 5 | Status: SHIPPED | OUTPATIENT
Start: 2022-11-02

## 2022-12-15 ENCOUNTER — HOSPITAL ENCOUNTER (OUTPATIENT)
Dept: GENERAL RADIOLOGY | Facility: HOSPITAL | Age: 62
Discharge: HOME | End: 2022-12-15

## 2022-12-15 DIAGNOSIS — M79.10 MYALGIA: ICD-10-CM

## 2022-12-15 DIAGNOSIS — J44.9 CHRONIC OBSTRUCTIVE PULMONARY DISEASE, UNSPECIFIED COPD TYPE: ICD-10-CM

## 2022-12-15 DIAGNOSIS — M54.2 NECK PAIN: ICD-10-CM

## 2022-12-15 PROCEDURE — 72050 X-RAY EXAM NECK SPINE 4/5VWS: CPT

## 2022-12-15 PROCEDURE — 71046 X-RAY EXAM CHEST 2 VIEWS: CPT

## 2022-12-22 ENCOUNTER — TELEPHONE (OUTPATIENT)
Dept: FAMILY MEDICINE CLINIC | Facility: CLINIC | Age: 62
End: 2022-12-22

## 2022-12-22 NOTE — TELEPHONE ENCOUNTER
I'm pretty sure that was an error. I have no notes for her. I called her back with no answer. If she calls again, there is nothing needed!

## 2022-12-23 ENCOUNTER — TELEPHONE (OUTPATIENT)
Dept: FAMILY MEDICINE CLINIC | Facility: CLINIC | Age: 62
End: 2022-12-23

## 2022-12-23 NOTE — TELEPHONE ENCOUNTER
Hub staff attempted to follow warm transfer process and was unsuccessful     Caller: Miguelina Cruz    Relationship to patient: Self    Best call back number: [phone]    Patient is needing: RETURNING CALL TO [facility] - BELIEVES IT CONCERNS XRAY RESULTS

## 2023-01-06 ENCOUNTER — TELEPHONE (OUTPATIENT)
Dept: FAMILY MEDICINE CLINIC | Facility: CLINIC | Age: 63
End: 2023-01-06
Payer: COMMERCIAL

## 2023-01-06 NOTE — TELEPHONE ENCOUNTER
Patient informed of xray results. Does yoga daily. Patient is agreeable to MRI. Please place referral. Cannot do Tuesdays.

## 2023-01-08 DIAGNOSIS — M54.2 NECK PAIN: Primary | ICD-10-CM

## 2023-03-02 ENCOUNTER — HOSPITAL ENCOUNTER (OUTPATIENT)
Dept: MRI IMAGING | Facility: HOSPITAL | Age: 63
Discharge: HOME | End: 2023-03-02
Admitting: NURSE PRACTITIONER
Payer: MEDICAID

## 2023-03-02 DIAGNOSIS — M54.2 NECK PAIN: ICD-10-CM

## 2023-03-02 PROCEDURE — 72141 MRI NECK SPINE W/O DYE: CPT

## 2023-03-19 DIAGNOSIS — M50.20 PROTRUSION OF CERVICAL INTERVERTEBRAL DISC: Primary | ICD-10-CM

## 2023-04-17 NOTE — PROGRESS NOTES
Patient ID: Miguelina Cruz is a 62 y.o. female is being seen for consultation today at the request of ALBA Kwon diagnosis Protrusion of cervical intervertebral disc, MRI Cervical Spine WO 3/2/23, XR Cervical Spine Complete 4 or 5 VWS 12/15/22.    Today, Miguelina Cruz reports neck pain with radiating pain to bilateral shoulders and tingling and numbness, pain and weakness down left arm. She report neck stiffness and decreased range of motion. She reports no headaches.   She reports use of a heating pad and night with moderate relief.    Subjective     The patient is here in regards to   Chief Complaint   Patient presents with   • Neck Pain   • Arm Pain   • Numbness   • Extremity Weakness       History of Present Illness  Miguelina has had 1 year of left-sided shoulder pain with pain radiating down her left shoulder and occasionally into her arm and fingers.  She also has cervicalgia for approximately 1 year.  She used to be a smoker but quit approximately 5 years ago.  She is heavily involved in yoga and remaining active and does approximately 100 squats per day.  She did have an episode recently where she was doing squats and could not feel power in her legs and was unable to get up but that has not happened since then or before then.  She denies any balance issues or dexterity issues currently.  She fell down the stairs approximately 1 year ago and landed on her left shoulder, she was not able to lift her arm up above 90 degrees before and she has had some gradual improvement since then but still has abnormal range of motion on her left side.      While in the room and during my examination of the patient I wore a mask and eye protection.  I washed my hands before and after this patient encounter.  The patient was also wearing a mask.    The following portions of the patient's history were reviewed and updated as appropriate: allergies, current medications, past family history, past medical history, past  social history, past surgical history and problem list.    Review of Systems   Constitutional: Positive for activity change.   HENT: Negative.    Eyes: Negative.    Respiratory: Negative for chest tightness and shortness of breath.    Cardiovascular: Negative.    Gastrointestinal: Positive for constipation. Negative for abdominal pain.   Endocrine: Negative.    Genitourinary: Negative for difficulty urinating, frequency and urgency.   Musculoskeletal: Positive for neck pain and neck stiffness.   Allergic/Immunologic: Negative.    Neurological: Positive for weakness and numbness. Negative for headaches.   Hematological: Does not bruise/bleed easily.   Psychiatric/Behavioral: Negative for suicidal ideas.        Past Medical History:   Diagnosis Date   • ADHD    • COPD (chronic obstructive pulmonary disease)    • Fibromyalgia        Allergies   Allergen Reactions   • Tetracyclines & Related        Family History   Problem Relation Age of Onset   • COPD Mother    • Lung disease Mother    • COPD Sister    • Other Other         malignant neoplasm   • Other Other         malignant neoplasm       Social History     Socioeconomic History   • Marital status: Single   Tobacco Use   • Smoking status: Former     Packs/day: 1.00     Years: 30.00     Pack years: 30.00     Types: Cigarettes     Quit date: 8/3/2008     Years since quittin.7   • Smokeless tobacco: Never   • Tobacco comments:     Pt quit smoking 5 yrs ago    Vaping Use   • Vaping Use: Never used   Substance and Sexual Activity   • Alcohol use: Yes     Comment: rare   • Drug use: No     Types: Hydrocodone   • Sexual activity: Defer       Past Surgical History:   Procedure Laterality Date   • FOOT SURGERY           Objective     Vitals:    23 1430   BP: 140/80   Pulse: 96   SpO2: 95%     Body mass index is 22.13 kg/m².    Physical Exam  Constitutional:       Appearance: Normal appearance.   HENT:      Head: Normocephalic and atraumatic.   Eyes:       Extraocular Movements: Extraocular movements intact.      Conjunctiva/sclera: Conjunctivae normal.      Pupils: Pupils are equal, round, and reactive to light.   Cardiovascular:      Rate and Rhythm: Normal rate and regular rhythm.      Pulses: Normal pulses.   Pulmonary:      Breath sounds: Normal breath sounds.   Abdominal:      Palpations: Abdomen is soft.   Musculoskeletal:         General: Tenderness and signs of injury present.      Cervical back: Normal range of motion and neck supple.      Comments: Tenderness and decreased range of motion in her left shoulder   Skin:     General: Skin is warm and dry.   Neurological:      Mental Status: She is alert and oriented to person, place, and time.      Cranial Nerves: Cranial nerves 2-12 are intact.      Motor: Motor function is intact. No weakness or atrophy.      Coordination: Coordination is intact. Romberg sign negative. Romberg Test normal.      Gait: Gait is intact. Gait normal.      Deep Tendon Reflexes: Reflexes are normal and symmetric.      Reflex Scores:       Tricep reflexes are 2+ on the right side and 2+ on the left side.       Bicep reflexes are 2+ on the right side and 2+ on the left side.       Brachioradialis reflexes are 2+ on the right side and 2+ on the left side.       Patellar reflexes are 2+ on the right side and 2+ on the left side.       Achilles reflexes are 2+ on the right side and 2+ on the left side.  Psychiatric:         Speech: Speech normal.         Neurologic Exam     Mental Status   Oriented to person, place, and time.   Attention: normal. Concentration: normal.   Speech: speech is normal   Level of consciousness: alert    Cranial Nerves   Cranial nerves II through XII intact.     CN III, IV, VI   Pupils are equal, round, and reactive to light.    Motor Exam   Muscle bulk: normal  Overall muscle tone: normal    Strength   Strength 5/5 except as noted.     Sensory Exam   Light touch normal.     Gait, Coordination, and Reflexes      Gait  Gait: normal    Coordination   Romberg: negative    Reflexes   Reflexes 2+ except as noted.   Right brachioradialis: 2+  Left brachioradialis: 2+  Right biceps: 2+  Left biceps: 2+  Right triceps: 2+  Left triceps: 2+  Right patellar: 2+  Left patellar: 2+  Right achilles: 2+  Left achilles: 2+  Right Franco: absent  Left Franco: absent      Assessment & Plan   Independent Review of Radiographic Studies:      I personally reviewed the images from the following studies.    MR: MRI of the cervical spine wo contrast was reviewed and shows Redwood-neck deformity of the cervical spine with Modic endplate changes at C4-5 and degenerative disc disease at C3-4, C4-5, C5-6, C6-7 with disc bulges at multiple levels causing narrowing of the spinal canal with no obvious cord compression or cord signal change    Assessment/Plan: Miguelina does not appear to have any signs or symptoms of myelopathy or cervical radiculopathy despite her severe cervical deformity.  At this point she does have cervicalgia alone.  I did talk to her about signs and symptoms to watch out for for developing myelopathy which could result in a cervical spinal cord injury if she were to have a fall with stenosis and myelopathy.  She understands to look out for symptoms of radiculopathy, balance issues, dexterity issues and to call me when those develop.  She will need an extensive AP cervical fusion to correct her deformity in that case.    Her major issues stem from an injury to her left shoulder and that she sustained 1 years ago.  It seems to be improving spontaneously but I would like to obtain an MRI of her left shoulder and refer her to orthopedics.    Medical Decision Making:      MRI of the left shoulder, referral to orthopedic surgery         Diagnoses and all orders for this visit:    1. 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 Cervicalgia             Patient Instructions/Recommendations:    Follow-up as needed      Jasbir Emery MD  04/19/23  15:03 EDT

## 2023-04-19 ENCOUNTER — OFFICE VISIT (OUTPATIENT)
Dept: NEUROSURGERY | Facility: CLINIC | Age: 63
End: 2023-04-19
Payer: MEDICAID

## 2023-04-19 VITALS
SYSTOLIC BLOOD PRESSURE: 140 MMHG | BODY MASS INDEX: 22.13 KG/M2 | HEART RATE: 96 BPM | OXYGEN SATURATION: 95 % | DIASTOLIC BLOOD PRESSURE: 80 MMHG | WEIGHT: 133 LBS

## 2023-04-19 DIAGNOSIS — M54.2 CERVICALGIA: ICD-10-CM

## 2023-04-19 DIAGNOSIS — S49.92XA INJURY OF LEFT SHOULDER, INITIAL ENCOUNTER: Primary | ICD-10-CM

## 2023-04-19 DIAGNOSIS — M43.8X2 SWAN NECK DEFORMITY OF CERVICAL SPINE: ICD-10-CM

## 2023-05-02 ENCOUNTER — TELEPHONE (OUTPATIENT)
Dept: ORTHOPEDIC SURGERY | Facility: CLINIC | Age: 63
End: 2023-05-02

## 2023-06-08 DIAGNOSIS — S49.92XA INJURY OF LEFT SHOULDER, INITIAL ENCOUNTER: Primary | ICD-10-CM

## 2023-09-14 NOTE — TELEPHONE ENCOUNTER
No future appointment.    Last O.V. 05/07/2018.  [facility] 03/26/2018.  Filled 04/19/2018.    Thank you.    ----- Message from Radha Peralta sent at 5/10/2018 12:59 PM EDT -----  PT NEEDS A WRITTEN RX FOR:  #HYDROCODONE 10/325 1Q 6HRS #100  PLEASE CALL [phone] WHEN READY    
No

## 2024-06-15 NOTE — TELEPHONE ENCOUNTER
NO FUTURE APPT.    LAST OV 02/07/2018.  KWADWO 11/29/2017.  FILLED 01/18/2018.    THANK YOU.    ----- Message from Sapphire Talbert sent at 2/8/2018  1:46 PM EST -----  HYDROcodone-acetaminophen (NORCO)  MG per tablet   Sig: Take 1 tablet by mouth Every 6 (Six) Hours As Needed for Moderate Pain .           skin

## 2025-06-20 NOTE — TELEPHONE ENCOUNTER
NOTIFIED PT THAT RX IS SENT   [Time Spent: ___ minutes] : I have spent [unfilled] minutes of time on the encounter which excludes teaching and separately reported services.